# Patient Record
Sex: FEMALE | Race: WHITE | NOT HISPANIC OR LATINO | Employment: PART TIME | URBAN - METROPOLITAN AREA
[De-identification: names, ages, dates, MRNs, and addresses within clinical notes are randomized per-mention and may not be internally consistent; named-entity substitution may affect disease eponyms.]

---

## 2020-09-28 ENCOUNTER — OFFICE VISIT (OUTPATIENT)
Dept: OBSTETRICS AND GYNECOLOGY | Facility: CLINIC | Age: 40
End: 2020-09-28

## 2020-09-28 VITALS
SYSTOLIC BLOOD PRESSURE: 112 MMHG | BODY MASS INDEX: 23.25 KG/M2 | HEIGHT: 68 IN | WEIGHT: 153.4 LBS | DIASTOLIC BLOOD PRESSURE: 60 MMHG

## 2020-09-28 DIAGNOSIS — D68.51 FACTOR V LEIDEN (HCC): ICD-10-CM

## 2020-09-28 DIAGNOSIS — Z30.41 ENCOUNTER FOR SURVEILLANCE OF CONTRACEPTIVE PILLS: ICD-10-CM

## 2020-09-28 DIAGNOSIS — Z01.419 ENCOUNTER FOR ANNUAL ROUTINE GYNECOLOGICAL EXAMINATION: Primary | ICD-10-CM

## 2020-09-28 DIAGNOSIS — Z80.3 FAMILY HISTORY OF BREAST CANCER: ICD-10-CM

## 2020-09-28 PROCEDURE — 99395 PREV VISIT EST AGE 18-39: CPT | Performed by: NURSE PRACTITIONER

## 2020-09-28 RX ORDER — ACETAMINOPHEN AND CODEINE PHOSPHATE 120; 12 MG/5ML; MG/5ML
1 SOLUTION ORAL DAILY
Qty: 28 TABLET | Refills: 12 | Status: SHIPPED | OUTPATIENT
Start: 2020-09-28 | End: 2021-08-21

## 2020-09-28 RX ORDER — ASPIRIN 81 MG/1
81 TABLET ORAL DAILY
COMMUNITY

## 2020-09-28 RX ORDER — ACETAMINOPHEN AND CODEINE PHOSPHATE 120; 12 MG/5ML; MG/5ML
1 SOLUTION ORAL DAILY
COMMUNITY
End: 2020-09-28 | Stop reason: SDUPTHER

## 2020-09-28 NOTE — PROGRESS NOTES
GYN Annual Exam     CC - Here for annual exam.        HPI  Korina Rodriguez is a 39 y.o. female, , who presents for annual well woman exam. Patient's last menstrual period was 09/10/2020 (approximate)..  Periods are regular every 25-35 days, lasting 3-4 days. .  Dysmenorrhea:none.  Patient reports problems with: none.  Partner Status: Marital Status: .  New Partners since last visit: no.  Desires STD Screening: no.    Additional OB/GYN History   Current contraception: contraceptive methods: ocp- northindrone. H/o Factor V Leiden.   Desires to: continue contraception  Last Pap : 2018- normal  Last Completed Pap Smear       Status Date      PAP SMEAR No completions recorded        History of abnormal Pap smear: no  Family history of uterine, colon, breast, or ovarian cancer: yes - Mother- Breast, dx age 60.   Performs monthly Self-Breast Exam: no  Exercises Regularly:no  Feelings of Anxiety or Depression: no  Tobacco Usage?: No   OB History        2    Para   2    Term                AB        Living   2       SAB        TAB        Ectopic        Molar        Multiple        Live Births                    Health Maintenance   Topic Date Due   • Annual Gynecologic Pelvic and Breast Exam  1982   • ANNUAL PHYSICAL  10/18/1985   • TDAP/TD VACCINES (1 - Tdap) 10/18/2001   • INFLUENZA VACCINE  2020   • HEPATITIS C SCREENING  2020   • PAP SMEAR  2020   • Pneumococcal Vaccine 0-64  Aged Out       The additional following portions of the patient's history were reviewed and updated as appropriate: allergies, current medications, past family history, past medical history, past social history, past surgical history and problem list.    Review of Systems   Constitutional: Negative.    HENT: Negative.    Eyes: Negative.    Respiratory: Negative.    Cardiovascular: Negative.    Gastrointestinal: Negative.    Endocrine: Negative.    Genitourinary: Negative.    Musculoskeletal:  "Negative.    Skin: Negative.    Allergic/Immunologic: Negative.    Neurological: Negative.    Hematological: Negative.    Psychiatric/Behavioral: Negative.      All other systems reviewed and are negative.     I have reviewed and agree with the HPI, ROS, and historical information as entered above. Ange Ramirez, MY    Objective   /60   Ht 172.7 cm (68\")   Wt 69.6 kg (153 lb 6.4 oz)   LMP 09/10/2020 (Approximate)   BMI 23.32 kg/m²     Physical Exam  Vitals signs and nursing note reviewed. Exam conducted with a chaperone present.   Constitutional:       Appearance: She is well-developed.   HENT:      Head: Normocephalic and atraumatic.   Neck:      Musculoskeletal: Normal range of motion. No muscular tenderness.      Thyroid: No thyroid mass or thyromegaly.   Cardiovascular:      Rate and Rhythm: Normal rate and regular rhythm.      Heart sounds: No murmur.   Pulmonary:      Effort: Pulmonary effort is normal. No retractions.      Breath sounds: Normal breath sounds. No wheezing, rhonchi or rales.   Chest:      Chest wall: No mass or tenderness.      Breasts:         Right: Normal. No mass, nipple discharge, skin change or tenderness.         Left: Normal. No mass, nipple discharge, skin change or tenderness.   Abdominal:      General: Bowel sounds are normal.      Palpations: Abdomen is soft. Abdomen is not rigid. There is no mass.      Tenderness: There is no abdominal tenderness. There is no guarding.      Hernia: No hernia is present. There is no hernia in the left inguinal area.   Genitourinary:     Labia:         Right: No rash, tenderness or lesion.         Left: No rash, tenderness or lesion.       Vagina: Normal. No vaginal discharge or lesions.      Cervix: Normal.      Uterus: Normal. Not enlarged, not fixed and not tender.       Adnexa:         Right: No mass or tenderness.          Left: No mass or tenderness.        Rectum: No external hemorrhoid.   Neurological:      Mental Status: She is " alert and oriented to person, place, and time.   Psychiatric:         Behavior: Behavior normal.            Assessment and Plan    Problem List Items Addressed This Visit     None      Visit Diagnoses     Encounter for annual routine gynecological examination    -  Primary    Relevant Orders    Pap IG, HPV-hr    Encounter for surveillance of contraceptive pills        Factor V Leiden (CMS/[facility])        Family history of breast cancer              1. GYN annual well woman exam.   2. Reviewed monthly self breast exams.  Instructed to call with lumps, pain, or breast discharge.    3. Reviewed exercise as a preventative health measures.   4. Reccommended Flu Vaccine in Fall of each year.  5. RTC in 1 year or PRN with problems   6. Reviewed pap guidelines, next pap due 2023.       Ange Ramirez, APRN  09/28/2020

## 2020-10-02 DIAGNOSIS — Z01.419 ENCOUNTER FOR ANNUAL ROUTINE GYNECOLOGICAL EXAMINATION: ICD-10-CM

## 2021-08-21 RX ORDER — ACETAMINOPHEN AND CODEINE PHOSPHATE 120; 12 MG/5ML; MG/5ML
1 SOLUTION ORAL DAILY
Qty: 84 TABLET | Refills: 0 | Status: SHIPPED | OUTPATIENT
Start: 2021-08-21 | End: 2021-10-20 | Stop reason: SDUPTHER

## 2021-10-20 ENCOUNTER — OFFICE VISIT (OUTPATIENT)
Dept: OBSTETRICS AND GYNECOLOGY | Facility: CLINIC | Age: 41
End: 2021-10-20

## 2021-10-20 VITALS
DIASTOLIC BLOOD PRESSURE: 72 MMHG | SYSTOLIC BLOOD PRESSURE: 102 MMHG | WEIGHT: 150.8 LBS | HEIGHT: 68 IN | BODY MASS INDEX: 22.85 KG/M2

## 2021-10-20 DIAGNOSIS — Z01.419 PAP TEST, AS PART OF ROUTINE GYNECOLOGICAL EXAMINATION: Primary | ICD-10-CM

## 2021-10-20 DIAGNOSIS — Z30.41 ENCOUNTER FOR SURVEILLANCE OF CONTRACEPTIVE PILLS: ICD-10-CM

## 2021-10-20 PROCEDURE — 99395 PREV VISIT EST AGE 18-39: CPT | Performed by: NURSE PRACTITIONER

## 2021-10-20 RX ORDER — ACETAMINOPHEN AND CODEINE PHOSPHATE 120; 12 MG/5ML; MG/5ML
1 SOLUTION ORAL DAILY
Qty: 84 TABLET | Refills: 3 | Status: SHIPPED | OUTPATIENT
Start: 2021-10-20 | End: 2022-10-24 | Stop reason: SDUPTHER

## 2021-10-20 NOTE — PROGRESS NOTES
GYN Annual Exam     CC - Here for annual exam.        HPI  Korina Rodriguez is a 41 y.o. female, , who presents for annual well woman exam. Patient's last menstrual period was 10/13/2021..  Periods are irregular, lasting 4 days.  Dysmenorrhea:none.  Patient reports problems with: abnormal bleeding.  There were no changes to her medical or surgical history since her last visit.. Partner Status: Marital Status: .  She is sexually active. She has not had new partners since her last STD testing. She does not desire STD testing.      Patient stated that she has had 3 episodes of spotting. Patient stated that the spotting was random and was not consistent. Patient denies any pain or discomfort. Patient denies any other issues or concerns at today's visit. Pt admits to missing POP's, and sometimes not taking them at the same time.     Additional OB/GYN History   Current contraception: contraceptive methods: OCP (estrogen/progesterone)  Desires to: continue contraception  Last Pap :   Last Completed Pap Smear          Ordered - PAP SMEAR (Every 3 Years) Ordered on 10/20/2021    2020  SCANNED - PAP SMEAR              History of abnormal Pap smear: no  Family history of uterine, colon, breast, or ovarian cancer: yes - Mother has breast cancer  Performs monthly Self-Breast Exam: no  Exercises Regularly:no  Feelings of Anxiety or Depression: no  Tobacco Usage?: No   OB History        2    Para   2    Term                AB        Living   2       SAB        IAB        Ectopic        Molar        Multiple        Live Births                    Health Maintenance   Topic Date Due   • ANNUAL PHYSICAL  Never done   • TDAP/TD VACCINES (1 - Tdap) Never done   • HEPATITIS C SCREENING  Never done   • INFLUENZA VACCINE  Never done   • Annual Gynecologic Pelvic and Breast Exam  10/21/2022   • PAP SMEAR  2023   • COVID-19 Vaccine  Completed   • Pneumococcal Vaccine 0-64  Aged Out       The additional  "following portions of the patient's history were reviewed and updated as appropriate: allergies, current medications, past family history, past medical history, past social history and past surgical history.    Review of Systems   Constitutional: Negative.    HENT: Negative.    Eyes: Negative.    Respiratory: Negative.    Cardiovascular: Negative.    Gastrointestinal: Negative.    Endocrine: Negative.    Genitourinary:        Occ spotting on POP's   Musculoskeletal: Negative.    Skin: Negative.    Allergic/Immunologic: Negative.    Neurological: Negative.    Hematological: Negative.    Psychiatric/Behavioral: Negative.          I have reviewed and agree with the HPI, ROS, and historical information as entered above. Ange Ramirez, APRN    Objective   /72   Ht 172.7 cm (68\")   Wt 68.4 kg (150 lb 12.8 oz)   LMP 10/13/2021   Breastfeeding No   BMI 22.93 kg/m²     Physical Exam  Vitals and nursing note reviewed. Exam conducted with a chaperone present.   Constitutional:       Appearance: She is well-developed.   HENT:      Head: Normocephalic and atraumatic.   Neck:      Thyroid: No thyroid mass or thyromegaly.   Cardiovascular:      Rate and Rhythm: Normal rate and regular rhythm.      Heart sounds: No murmur heard.      Pulmonary:      Effort: Pulmonary effort is normal. No retractions.      Breath sounds: Normal breath sounds. No wheezing, rhonchi or rales.   Chest:      Chest wall: No mass or tenderness.   Breasts:      Right: Normal. No mass, nipple discharge, skin change or tenderness.      Left: Normal. No mass, nipple discharge, skin change or tenderness.       Abdominal:      General: Bowel sounds are normal.      Palpations: Abdomen is soft. Abdomen is not rigid. There is no mass.      Tenderness: There is no abdominal tenderness. There is no guarding.      Hernia: No hernia is present. There is no hernia in the left inguinal area.   Genitourinary:     Labia:         Right: No rash, tenderness or " lesion.         Left: No rash, tenderness or lesion.       Vagina: Normal. No vaginal discharge or lesions.      Cervix: No cervical motion tenderness, discharge, lesion or cervical bleeding.      Uterus: Normal. Not enlarged, not fixed and not tender.       Adnexa:         Right: No mass or tenderness.          Left: No mass or tenderness.        Rectum: No external hemorrhoid.   Musculoskeletal:      Cervical back: Normal range of motion. No muscular tenderness.   Neurological:      Mental Status: She is alert and oriented to person, place, and time.   Psychiatric:         Behavior: Behavior normal.            Assessment and Plan    Problem List Items Addressed This Visit     None      Visit Diagnoses     Pap test, as part of routine gynecological examination    -  Primary    Relevant Orders    Pap IG, HPV-hr    Encounter for surveillance of contraceptive pills              1. GYN annual well woman exam.   2. Reviewed monthly self breast exams.  Instructed to call with lumps, pain, or breast discharge.    3. Reviewed exercise as a preventative health measures.   4. Other: Spotting on POP's- Pt admits to sometimes missing or taking pills late. Will take same time daily. Advised to call if continues despite taking correctly and we can consider labs and U/S. Pt not a candidate for estrogen containing birth control.   Return in about 1 year (around 10/20/2022) for Annual physical.      MY Vigil  10/20/2021

## 2021-10-27 DIAGNOSIS — Z01.419 PAP TEST, AS PART OF ROUTINE GYNECOLOGICAL EXAMINATION: ICD-10-CM

## 2022-10-24 ENCOUNTER — OFFICE VISIT (OUTPATIENT)
Dept: OBSTETRICS AND GYNECOLOGY | Facility: CLINIC | Age: 42
End: 2022-10-24

## 2022-10-24 VITALS — SYSTOLIC BLOOD PRESSURE: 118 MMHG | DIASTOLIC BLOOD PRESSURE: 78 MMHG | WEIGHT: 144.2 LBS | BODY MASS INDEX: 21.93 KG/M2

## 2022-10-24 DIAGNOSIS — Z30.41 ENCOUNTER FOR SURVEILLANCE OF CONTRACEPTIVE PILLS: ICD-10-CM

## 2022-10-24 DIAGNOSIS — Z01.419 ROUTINE GYNECOLOGICAL EXAMINATION: Primary | ICD-10-CM

## 2022-10-24 DIAGNOSIS — Z12.31 ENCOUNTER FOR SCREENING MAMMOGRAM FOR MALIGNANT NEOPLASM OF BREAST: ICD-10-CM

## 2022-10-24 PROCEDURE — 99396 PREV VISIT EST AGE 40-64: CPT | Performed by: NURSE PRACTITIONER

## 2022-10-24 RX ORDER — ACETAMINOPHEN AND CODEINE PHOSPHATE 120; 12 MG/5ML; MG/5ML
1 SOLUTION ORAL DAILY
Qty: 84 TABLET | Refills: 3 | Status: SHIPPED | OUTPATIENT
Start: 2022-10-24

## 2022-10-24 NOTE — PROGRESS NOTES
Gynecologic Annual Exam Note          GYN Annual Exam     Gynecologic Exam        Subjective     HPI  Korina Rodriguez is a 42 y.o. female, , who presents for annual well woman exam as a established patient . Patient's last menstrual period was 10/07/2022 (exact date)..  Periods are irregular, lasting 2-7 days. Patient reports problems with: none.  Partner Status: Marital Status: . She is is sexually active. She has not had new partners.. STD testing recommendations have been explained to the patient and she does not desire STD testing. There were no changes to her medical or surgical history since her last visit..       Additional OB/GYN History   Current contraception: contraceptive methods: OCP (estrogen/progesterone)  Desires to: continue contraception    Last Pap : 10/20/2021. Result: negative. HPV: negative  Last Completed Pap Smear          Ordered - PAP SMEAR (Every 3 Years) Ordered on 10/27/2021    10/20/2021  SCANNED - PAP SMEAR    2020  SCANNED - PAP SMEAR              History of abnormal Pap smear: no  Family history of uterine, colon, breast, or ovarian cancer: yes - Mother- Breast  Performs monthly Self-Breast Exam: yes  Last mammogram: Never.     Last Completed Mammogram     This patient has no relevant Health Maintenance data.          History of abnormal mammogram: no    Colonoscopy: has never had a colonoscopy.  Exercises Regularly: no  Feelings of Anxiety or Depression: no  Tobacco Usage?: No       Current Outpatient Medications:   •  aspirin 81 MG EC tablet, Take 81 mg by mouth Daily., Disp: , Rfl:   •  norethindrone (Nolvia) 0.35 MG tablet, Take 1 tablet by mouth Daily., Disp: 84 tablet, Rfl: 3     Patient is requesting refills of OCP.    OB History        2    Para   2    Term                AB        Living   2       SAB        IAB        Ectopic        Molar        Multiple        Live Births                    Past Medical History:   Diagnosis Date   • Factor  MAHI Yarbrough ([facility])    • Screening breast examination     self admits        No past surgical history on file.    Health Maintenance   Topic Date Due   • ANNUAL PHYSICAL  Never done   • TDAP/TD VACCINES (1 - Tdap) Never done   • HEPATITIS C SCREENING  Never done   • COVID-19 Vaccine (4 - Booster for Pfizer series) 02/09/2022   • INFLUENZA VACCINE  08/01/2022   • Annual Gynecologic Pelvic and Breast Exam  10/21/2022   • PAP SMEAR  10/20/2024   • Pneumococcal Vaccine 0-64  Aged Out       The additional following portions of the patient's history were reviewed and updated as appropriate: allergies, current medications, past family history, past medical history, past social history, past surgical history and problem list.    Review of Systems   Constitutional: Negative.    HENT: Negative.    Eyes: Negative.    Respiratory: Negative.    Cardiovascular: Negative.    Gastrointestinal: Negative.    Endocrine: Negative.    Genitourinary: Negative.    Musculoskeletal: Negative.    Skin: Negative.    Allergic/Immunologic: Negative.    Neurological: Negative.    Hematological: Negative.    Psychiatric/Behavioral: Negative.          I have reviewed and agree with the HPI, ROS, and historical information as entered above. Smiley Ackerman, APRN      Objective   /78   Wt 65.4 kg (144 lb 3.2 oz)   LMP 10/07/2022 (Exact Date)   BMI 21.93 kg/m²     Physical Exam  Vitals and nursing note reviewed. Exam conducted with a chaperone present.   Constitutional:       Appearance: Normal appearance. She is well-developed and normal weight.   HENT:      Head: Normocephalic and atraumatic.   Neck:      Thyroid: No thyroid mass or thyromegaly.   Cardiovascular:      Rate and Rhythm: Normal rate and regular rhythm.      Heart sounds: No murmur heard.  Pulmonary:      Effort: Pulmonary effort is normal. No retractions.      Breath sounds: Normal breath sounds. No wheezing, rhonchi or rales.   Chest:      Chest wall: No mass or  tenderness.   Breasts:     Right: Normal. No mass, nipple discharge, skin change or tenderness.      Left: Normal. No mass, nipple discharge, skin change or tenderness.   Abdominal:      Palpations: Abdomen is soft. Abdomen is not rigid. There is no mass.      Tenderness: There is no abdominal tenderness. There is no guarding.      Hernia: No hernia is present.   Genitourinary:     General: Normal vulva.      Exam position: Lithotomy position.      Labia:         Right: No rash, tenderness or lesion.         Left: No rash, tenderness or lesion.       Vagina: Normal. No vaginal discharge or lesions.      Cervix: No cervical motion tenderness, discharge, lesion or cervical bleeding.      Uterus: Normal. Not enlarged, not fixed and not tender.       Adnexa: Right adnexa normal and left adnexa normal.        Right: No mass or tenderness.          Left: No mass or tenderness.        Rectum: Normal. No external hemorrhoid.   Musculoskeletal:      Cervical back: Normal range of motion. No muscular tenderness.   Neurological:      Mental Status: She is alert and oriented to person, place, and time.   Psychiatric:         Behavior: Behavior normal.            Assessment and Plan    Problem List Items Addressed This Visit    None  Visit Diagnoses     Routine gynecological examination    -  Primary    Encounter for screening mammogram for malignant neoplasm of breast        Relevant Orders    Mammo Screening Digital Tomosynthesis Bilateral With CAD    Encounter for surveillance of contraceptive pills        Relevant Medications    norethindrone (Nolvia) 0.35 MG tablet          1. GYN annual well woman exam.   2. Pt doing well on current ocp and desires to continue. Erx refilled  3. Pap guidelines reviewed.  4. Reviewed risks/benefits of hormonal contraception after age 35, including possible increased risk of breast cancer, heart disease, blood clots and strokes.  Patient strongly desires to stay on hormonal  contraception.  5. Reviewed monthly self breast exams.  Instructed to call with lumps, pain, or breast discharge.    6. Ordered Mammogram today  7. Reviewed exercise as a preventative health measures.   8. Reccommended Flu Vaccine in Fall of each year.  9. RTC in 1 year or PRN with problems.    Smiley Ackerman, APRN  10/24/2022

## 2022-12-07 ENCOUNTER — APPOINTMENT (OUTPATIENT)
Dept: MAMMOGRAPHY | Facility: HOSPITAL | Age: 42
End: 2022-12-07

## 2023-05-09 ENCOUNTER — OFFICE VISIT (OUTPATIENT)
Dept: ENDOCRINOLOGY | Facility: CLINIC | Age: 43
End: 2023-05-09
Payer: COMMERCIAL

## 2023-05-09 VITALS
HEART RATE: 81 BPM | BODY MASS INDEX: 20.61 KG/M2 | HEIGHT: 68 IN | WEIGHT: 136 LBS | DIASTOLIC BLOOD PRESSURE: 82 MMHG | OXYGEN SATURATION: 99 % | SYSTOLIC BLOOD PRESSURE: 110 MMHG

## 2023-05-09 DIAGNOSIS — E05.90 HYPERTHYROIDISM: Primary | Chronic | ICD-10-CM

## 2023-05-09 PROCEDURE — 99204 OFFICE O/P NEW MOD 45 MIN: CPT | Performed by: PHYSICIAN ASSISTANT

## 2023-05-09 PROCEDURE — 84445 ASSAY OF TSI GLOBULIN: CPT | Performed by: PHYSICIAN ASSISTANT

## 2023-05-09 PROCEDURE — 86376 MICROSOMAL ANTIBODY EACH: CPT | Performed by: PHYSICIAN ASSISTANT

## 2023-05-09 PROCEDURE — 84439 ASSAY OF FREE THYROXINE: CPT | Performed by: PHYSICIAN ASSISTANT

## 2023-05-09 PROCEDURE — 86800 THYROGLOBULIN ANTIBODY: CPT | Performed by: PHYSICIAN ASSISTANT

## 2023-05-09 PROCEDURE — 84443 ASSAY THYROID STIM HORMONE: CPT | Performed by: PHYSICIAN ASSISTANT

## 2023-05-09 PROCEDURE — 84480 ASSAY TRIIODOTHYRONINE (T3): CPT | Performed by: PHYSICIAN ASSISTANT

## 2023-05-09 PROCEDURE — 83520 IMMUNOASSAY QUANT NOS NONAB: CPT | Performed by: PHYSICIAN ASSISTANT

## 2023-05-09 NOTE — PROGRESS NOTES
"Chief Complaint:   Korina Rodriguez is a 42 y.o. female who is being seen today for hyperthyroidism. Referred by Deja Antonio MD.    HPI     42 y.o. female presents for further evaluation and management of hyperthyroidism.     She had a syncopal episode while sitting in [employer] 4/2023. She was sitting down and suddenly felt like everything \"froze\" and then she just passed out. She was unconscious less than a minute. She does not recall any palpitations or diaphoresis prior to syncope. When EMS arrived her BG was 71. She reports increase in sugar intake recently. The day before syncope she had quite a bit of sweets and regular soda. The morning of syncope she had toast and milk. No hx of BG issues. No family hx of diabetes. Her mom also had syncopal episodes, but she is not sure if she ever got a dx.     Patient has had presyncopal spells several times in the last 10 years but this was the first time she actually passed out.   The presyncopal episodes tend to happen a few days before her period. Her periods are irregular but no changes recently.   She has lost close to 20 pounds in the last two years with improved diet. She does report about a 6 pounds weight loss over the last few months that surprised her as she was not actively trying to lose weight.  Appetite is stable.  Small increase in hair loss.  She had a slight tremor of left hand the night before she passed out. Otherwise no tremors.  A few days prior to syncope she had a brief episode of palpitations otherwise nothing.    Few days before syncope palpitations 1 time, none the day of syncope.   Eyesight slightly more blurry. No double vision or eye pressure. No compressive symptoms.     Hx of radiation to head/neck: no  Family hx of thyroid cancer: no    The following portions of the patient's history were reviewed and updated as appropriate: allergies, current medications, past family history, past medical history, past social history, past surgical history " and problem list.    Review of Systems  Review of Systems   Constitutional: Positive for unexpected weight change (weight loss).   Neurological: Positive for syncope.   All other systems reviewed and are negative.       Current medications:  Current Outpatient Medications   Medication Sig Dispense Refill   • aspirin 81 MG EC tablet Take 1 tablet by mouth Daily.     • norethindrone (Nolvia) 0.35 MG tablet Take 1 tablet by mouth Daily. 84 tablet 3     No current facility-administered medications for this visit.       Physical Exam   Vitals:    05/09/23 1345   BP: 110/82   Pulse: 81   SpO2: 99%   Body mass index is 20.68 kg/m².  Physical Exam  Constitutional:       General: She is not in acute distress.     Appearance: She is well-developed. She is not diaphoretic.   HENT:      Head: Normocephalic.      Right Ear: External ear normal.      Left Ear: External ear normal.   Eyes:      General: Lids are normal.         Right eye: No discharge.         Left eye: No discharge.      Conjunctiva/sclera: Conjunctivae normal.   Neck:      Thyroid: Thyromegaly present. No thyroid mass.      Vascular: No JVD.      Trachea: No tracheal deviation.   Cardiovascular:      Rate and Rhythm: Normal rate and regular rhythm.      Heart sounds: Normal heart sounds. No murmur heard.  Pulmonary:      Effort: Pulmonary effort is normal. No respiratory distress.      Breath sounds: Normal breath sounds. No wheezing.   Musculoskeletal:         General: No tenderness.   Lymphadenopathy:      Cervical: No cervical adenopathy.   Skin:     General: Skin is warm and dry.      Findings: No erythema or rash.   Neurological:      Mental Status: She is alert and oriented to person, place, and time.   Psychiatric:         Speech: Speech normal.         Behavior: Behavior normal.         Thought Content: Thought content normal.         Labs and Imaging   No results found for: TSH, [id number], W8YTERC, THYROIDAB         External labs reviewed:  5/5/2023: TSH  less than 0.03, free T42.34, range 0.75-1.54), CBC within normal limits, GFR greater than 60, AST 14, ALT 15  4/18/2023: TSH less than 0.03    Assessment / Plan     Diagnoses and all orders for this visit:    1. Hyperthyroidism (Primary)  -     TSH  -     T4, Free  -     T3  -     Thyroid Stimulating Immunoglobulin  -     Thyrotropin Receptor Antibody  -     Thyroid Antibodies        Problem List Items Addressed This Visit    None  Visit Diagnoses     Hyperthyroidism    -  Primary    Relevant Orders    TSH    T4, Free    T3    Thyroid Stimulating Immunoglobulin    Thyrotropin Receptor Antibody    Thyroid Antibodies        Diagnosis was discussed and reviewed with the patient including the advantages of drug therapy.        1. Patient appears clinically hyperthyroid. Discussed possible etiologies of hyperthyroidism including thyroiditis, Graves' disease, and toxic nodule. Repeat TFTs. Check thyroid antibodies and thyroid ultrasound. Discussed treatment options - will wait results of labs. Hold off on beta blocker as she rarely had palpitations/tremors, HR wnl and BP low normal during visit today.   2. Patient will return to our office in 4 weeks.  3. The risks and benefits of my recommendations, as well as other treatment options were discussed with the patient today. Questions were answered.      Ludwig Cruz PA-C  Endocrinology

## 2023-05-09 NOTE — LETTER
"May 9, 2023       No Recipients    Patient: Korina Rodriguez   YOB: 1980   Date of Visit: 5/9/2023       Dear Dr. Walker Recipients:    Thank you for referring Korina Rodriguez to me for evaluation. Below are the relevant portions of my assessment and plan of care.    If you have questions, please do not hesitate to call me. I look forward to following Korina along with you.         Sincerely,        Ludwig Cruz PA-C        CC:   No Recipients    Ludwig Cruz PA-C  05/09/23 1455  Sign when Signing Visit  Chief Complaint:  Korina Rodriguez is a 42 y.o. female who is being seen today for hyperthyroidism. Referred by Deja Antonio MD.    HPI     42 y.o. female presents for further evaluation and management of hyperthyroidism.     She had a syncopal episode while sitting in [employer] 4/2023. She was sitting down and suddenly felt like everything \"froze\" and then she just passed out. She was unconscious less than a minute. She does not recall any palpitations or diaphoresis prior to syncope. When EMS arrived her BG was 71. She reports increase in sugar intake recently. The day before syncope she had quite a bit of sweets and regular soda. The morning of syncope she had toast and milk. No hx of BG issues. No family hx of diabetes. Her mom also had syncopal episodes, but she is not sure if she ever got a dx.     Patient has had presyncopal spells several times in the last 10 years but this was the first time she actually passed out.   The presyncopal episodes tend to happen a few days before her period. Her periods are irregular but no changes recently.   She has lost close to 20 pounds in the last two years with improved diet. She does report about a 6 pounds weight loss over the last few months that surprised her as she was not actively trying to lose weight.  Appetite is stable.  Small increase in hair loss.  She had a slight tremor of left hand the night before she passed out. Otherwise no tremors.  A " few days prior to syncope she had a brief episode of palpitations otherwise nothing.    Few days before syncope palpitations 1 time, none the day of syncope.   Eyesight slightly more blurry. No double vision or eye pressure. No compressive symptoms.     Hx of radiation to head/neck: no  Family hx of thyroid cancer: no    The following portions of the patient's history were reviewed and updated as appropriate: allergies, current medications, past family history, past medical history, past social history, past surgical history and problem list.    Review of Systems  Review of Systems   Constitutional: Positive for unexpected weight change (weight loss).   Neurological: Positive for syncope.   All other systems reviewed and are negative.       Current medications:  Current Outpatient Medications   Medication Sig Dispense Refill   • aspirin 81 MG EC tablet Take 1 tablet by mouth Daily.     • norethindrone (Nolvia) 0.35 MG tablet Take 1 tablet by mouth Daily. 84 tablet 3     No current facility-administered medications for this visit.       Physical Exam   Vitals:    05/09/23 1345   BP: 110/82   Pulse: 81   SpO2: 99%   Body mass index is 20.68 kg/m².  Physical Exam  Constitutional:       General: She is not in acute distress.     Appearance: She is well-developed. She is not diaphoretic.   HENT:      Head: Normocephalic.      Right Ear: External ear normal.      Left Ear: External ear normal.   Eyes:      General: Lids are normal.         Right eye: No discharge.         Left eye: No discharge.      Conjunctiva/sclera: Conjunctivae normal.   Neck:      Thyroid: Thyromegaly present. No thyroid mass.      Vascular: No JVD.      Trachea: No tracheal deviation.   Cardiovascular:      Rate and Rhythm: Normal rate and regular rhythm.      Heart sounds: Normal heart sounds. No murmur heard.  Pulmonary:      Effort: Pulmonary effort is normal. No respiratory distress.      Breath sounds: Normal breath sounds. No wheezing.    Musculoskeletal:         General: No tenderness.   Lymphadenopathy:      Cervical: No cervical adenopathy.   Skin:     General: Skin is warm and dry.      Findings: No erythema or rash.   Neurological:      Mental Status: She is alert and oriented to person, place, and time.   Psychiatric:         Speech: Speech normal.         Behavior: Behavior normal.         Thought Content: Thought content normal.         Labs and Imaging   No results found for: TSH, C9GOYBG, G0JXPVW, THYROIDAB         External labs reviewed:  5/5/2023: TSH less than 0.03, free T42.34, range 0.75-1.54), CBC within normal limits, GFR greater than 60, AST 14, ALT 15  4/18/2023: TSH less than 0.03    Assessment / Plan     Diagnoses and all orders for this visit:    1. Hyperthyroidism (Primary)  -     TSH  -     T4, Free  -     T3  -     Thyroid Stimulating Immunoglobulin  -     Thyrotropin Receptor Antibody  -     Thyroid Antibodies        Problem List Items Addressed This Visit    None  Visit Diagnoses     Hyperthyroidism    -  Primary    Relevant Orders    TSH    T4, Free    T3    Thyroid Stimulating Immunoglobulin    Thyrotropin Receptor Antibody    Thyroid Antibodies        Diagnosis was discussed and reviewed with the patient including the advantages of drug therapy.      1. Patient appears clinically hyperthyroid. Discussed possible etiologies of hyperthyroidism including thyroiditis, Graves' disease, and toxic nodule. Repeat TFTs. Check thyroid antibodies and thyroid ultrasound. Discussed treatment options - will wait results of labs. Hold off on beta blocker as she rarely had palpitations/tremors, HR wnl and BP low normal during visit today.   2. Patient will return to our office in 4 weeks.  3. The risks and benefits of my recommendations, as well as other treatment options were discussed with the patient today. Questions were answered.      Ludwig Cruz PA-C  Endocrinology

## 2023-05-10 LAB
T3 SERPL-MCNC: 222 NG/DL (ref 80–200)
T4 FREE SERPL-MCNC: 2.97 NG/DL (ref 0.93–1.7)
TSH SERPL DL<=0.05 MIU/L-ACNC: <0.005 UIU/ML (ref 0.27–4.2)

## 2023-05-11 LAB
THYROGLOB AB SERPL-ACNC: <1 IU/ML (ref 0–0.9)
THYROPEROXIDASE AB SERPL-ACNC: 53 IU/ML (ref 0–34)

## 2023-05-12 LAB — TSI SER-ACNC: 3.96 IU/L (ref 0–0.55)

## 2023-05-12 RX ORDER — METHIMAZOLE 5 MG/1
5 TABLET ORAL DAILY
Qty: 30 TABLET | Refills: 1 | Status: SHIPPED | OUTPATIENT
Start: 2023-05-12 | End: 2024-05-11

## 2023-05-13 LAB — TSH RECEP AB SER-ACNC: 4.85 IU/L (ref 0–1.75)

## 2023-06-05 ENCOUNTER — OFFICE VISIT (OUTPATIENT)
Dept: ENDOCRINOLOGY | Facility: CLINIC | Age: 43
End: 2023-06-05
Payer: COMMERCIAL

## 2023-06-05 VITALS
WEIGHT: 136 LBS | SYSTOLIC BLOOD PRESSURE: 114 MMHG | HEART RATE: 91 BPM | OXYGEN SATURATION: 99 % | DIASTOLIC BLOOD PRESSURE: 62 MMHG | BODY MASS INDEX: 20.61 KG/M2 | HEIGHT: 68 IN

## 2023-06-05 DIAGNOSIS — E05.90 HYPERTHYROIDISM: Primary | Chronic | ICD-10-CM

## 2023-06-05 LAB
ALBUMIN SERPL-MCNC: 4.6 G/DL (ref 3.5–5.2)
ALBUMIN/GLOB SERPL: 1.8 G/DL
ALP SERPL-CCNC: 49 U/L (ref 39–117)
ALT SERPL W P-5'-P-CCNC: 24 U/L (ref 1–33)
ANION GAP SERPL CALCULATED.3IONS-SCNC: 9.1 MMOL/L (ref 5–15)
AST SERPL-CCNC: 15 U/L (ref 1–32)
BASOPHILS # BLD AUTO: 0.02 10*3/MM3 (ref 0–0.2)
BASOPHILS NFR BLD AUTO: 0.3 % (ref 0–1.5)
BILIRUB SERPL-MCNC: 0.4 MG/DL (ref 0–1.2)
BUN SERPL-MCNC: 10 MG/DL (ref 6–20)
BUN/CREAT SERPL: 14.7 (ref 7–25)
CALCIUM SPEC-SCNC: 10.5 MG/DL (ref 8.6–10.5)
CHLORIDE SERPL-SCNC: 104 MMOL/L (ref 98–107)
CO2 SERPL-SCNC: 26.9 MMOL/L (ref 22–29)
CREAT SERPL-MCNC: 0.68 MG/DL (ref 0.57–1)
DEPRECATED RDW RBC AUTO: 38.2 FL (ref 37–54)
EGFRCR SERPLBLD CKD-EPI 2021: 113.1 ML/MIN/1.73
EOSINOPHIL # BLD AUTO: 0.2 10*3/MM3 (ref 0–0.4)
EOSINOPHIL NFR BLD AUTO: 3.2 % (ref 0.3–6.2)
ERYTHROCYTE [DISTWIDTH] IN BLOOD BY AUTOMATED COUNT: 12.8 % (ref 12.3–15.4)
GLOBULIN UR ELPH-MCNC: 2.5 GM/DL
GLUCOSE SERPL-MCNC: 78 MG/DL (ref 65–99)
HCT VFR BLD AUTO: 40.2 % (ref 34–46.6)
HGB BLD-MCNC: 13.5 G/DL (ref 12–15.9)
IMM GRANULOCYTES # BLD AUTO: 0.01 10*3/MM3 (ref 0–0.05)
IMM GRANULOCYTES NFR BLD AUTO: 0.2 % (ref 0–0.5)
LYMPHOCYTES # BLD AUTO: 2.48 10*3/MM3 (ref 0.7–3.1)
LYMPHOCYTES NFR BLD AUTO: 39.4 % (ref 19.6–45.3)
MCH RBC QN AUTO: 28 PG (ref 26.6–33)
MCHC RBC AUTO-ENTMCNC: 33.6 G/DL (ref 31.5–35.7)
MCV RBC AUTO: 83.2 FL (ref 79–97)
MONOCYTES # BLD AUTO: 0.64 10*3/MM3 (ref 0.1–0.9)
MONOCYTES NFR BLD AUTO: 10.2 % (ref 5–12)
NEUTROPHILS NFR BLD AUTO: 2.95 10*3/MM3 (ref 1.7–7)
NEUTROPHILS NFR BLD AUTO: 46.7 % (ref 42.7–76)
NRBC BLD AUTO-RTO: 0 /100 WBC (ref 0–0.2)
PLATELET # BLD AUTO: 236 10*3/MM3 (ref 140–450)
PMV BLD AUTO: 10 FL (ref 6–12)
POTASSIUM SERPL-SCNC: 4.3 MMOL/L (ref 3.5–5.2)
PROT SERPL-MCNC: 7.1 G/DL (ref 6–8.5)
RBC # BLD AUTO: 4.83 10*6/MM3 (ref 3.77–5.28)
SODIUM SERPL-SCNC: 140 MMOL/L (ref 136–145)
T3 SERPL-MCNC: 151 NG/DL (ref 80–200)
T4 FREE SERPL-MCNC: 1.83 NG/DL (ref 0.93–1.7)
TSH SERPL DL<=0.05 MIU/L-ACNC: <0.005 UIU/ML (ref 0.27–4.2)
WBC NRBC COR # BLD: 6.3 10*3/MM3 (ref 3.4–10.8)

## 2023-06-05 PROCEDURE — 84439 ASSAY OF FREE THYROXINE: CPT | Performed by: PHYSICIAN ASSISTANT

## 2023-06-05 PROCEDURE — 99213 OFFICE O/P EST LOW 20 MIN: CPT | Performed by: PHYSICIAN ASSISTANT

## 2023-06-05 PROCEDURE — 80050 GENERAL HEALTH PANEL: CPT | Performed by: PHYSICIAN ASSISTANT

## 2023-06-05 PROCEDURE — 84480 ASSAY TRIIODOTHYRONINE (T3): CPT | Performed by: PHYSICIAN ASSISTANT

## 2023-06-05 RX ORDER — PROPRANOLOL HYDROCHLORIDE 10 MG/1
10 TABLET ORAL 2 TIMES DAILY
Qty: 60 TABLET | Refills: 3 | Status: SHIPPED | OUTPATIENT
Start: 2023-06-05

## 2023-06-05 NOTE — PROGRESS NOTES
Chief Complaint:   Korina Rodriguez is a 42 y.o. female who is being seen today for f/u of hyperthyroidism.     HPI     42 y.o. female presents for f/u of hyperthyroidism.     She was started on methimazole 5 mg daily last visit due to undetectable TSH and high FT4 2.97 and high total T3 222. TSI elevated at 3.96 and TSH receptor antibody elevated at 4.85. TPO was mildly elevated at 53.     Every now and then eyes get blurry but this is overall better than last time.   No further syncope.   Heart thumping hard, not fast, when moving around sometime.   No tremors or palpitations.   Appetite and weight are stable.     The following portions of the patient's history were reviewed and updated as appropriate: allergies, current medications, past family history, past medical history, past social history, past surgical history and problem list.    Review of Systems  Review of Systems   All other systems reviewed and are negative.     Current medications:  Current Outpatient Medications   Medication Sig Dispense Refill    aspirin 81 MG EC tablet Take 1 tablet by mouth Daily.      methIMAzole (TAPAZOLE) 5 MG tablet Take 1 tablet by mouth Daily. 30 tablet 1    norethindrone (Nolvia) 0.35 MG tablet Take 1 tablet by mouth Daily. 84 tablet 3    propranolol (INDERAL) 10 MG tablet Take 1 tablet by mouth 2 (Two) Times a Day. Take 1/2-1 pill BID As needed for tremors and palpitations 60 tablet 3     No current facility-administered medications for this visit.       Physical Exam   Vitals:    06/05/23 0827   BP: 114/62   Pulse: 91   SpO2: 99%   Body mass index is 20.68 kg/m².  Physical Exam  Constitutional:       General: She is not in acute distress.     Appearance: She is well-developed. She is not diaphoretic.   HENT:      Head: Normocephalic.      Right Ear: External ear normal.      Left Ear: External ear normal.   Eyes:      General: Lids are normal.         Right eye: No discharge.         Left eye: No discharge.       Conjunctiva/sclera: Conjunctivae normal.   Neck:      Thyroid: Thyromegaly present. No thyroid mass.      Vascular: No JVD.      Trachea: No tracheal deviation.   Cardiovascular:      Rate and Rhythm: Normal rate and regular rhythm.      Heart sounds: Normal heart sounds. No murmur heard.  Pulmonary:      Effort: Pulmonary effort is normal. No respiratory distress.      Breath sounds: Normal breath sounds. No wheezing.   Musculoskeletal:         General: No tenderness.   Lymphadenopathy:      Cervical: No cervical adenopathy.   Skin:     General: Skin is warm and dry.      Findings: No erythema or rash.   Neurological:      Mental Status: She is alert and oriented to person, place, and time.   Psychiatric:         Speech: Speech normal.         Behavior: Behavior normal.         Thought Content: Thought content normal.       Labs and Imaging   Lab Results   Component Value Date    TSH <0.005 (L) 05/09/2023    K1TEMZL 222.0 (H) 05/09/2023    THYROIDAB 53 (H) 05/09/2023         Assessment / Plan     Diagnoses and all orders for this visit:    1. Hyperthyroidism (Primary)  -     US Thyroid; Future  -     TSH  -     T4, Free  -     T3  -     Comprehensive Metabolic Panel  -     CBC & Differential  -     propranolol (INDERAL) 10 MG tablet; Take 1 tablet by mouth 2 (Two) Times a Day. Take 1/2-1 pill BID As needed for tremors and palpitations  Dispense: 60 tablet; Refill: 3  -     TSH; Future  -     T4, Free; Future  -     T3; Future  -     Comprehensive Metabolic Panel; Future  -     CBC & Differential; Future        Problem List Items Addressed This Visit    None  Visit Diagnoses       Hyperthyroidism  (Chronic)   -  Primary    Relevant Medications    propranolol (INDERAL) 10 MG tablet    Other Relevant Orders    US Thyroid    TSH    T4, Free    T3    Comprehensive Metabolic Panel    CBC & Differential    TSH    T4, Free    T3    Comprehensive Metabolic Panel    CBC & Differential        Diagnosis was discussed and  reviewed with the patient including the advantages of drug therapy.        1. Patient appears clinically euthyroid. Etiology likely Graves' disease with elevated antibodies. Check thyroid u/s. Repeat TFTs. Propranolol 10 mg 1/2-1 tab BID PRN sent in case she develops palpitations/tremors. Continue methimazole 5 mg daily until labs are reviewed.   2. Patient will return to our office in 3 months with repeat labs in 6 weeks.   3. The risks and benefits of my recommendations, as well as other treatment options were discussed with the patient today. Questions were answered.      Ludwig Cruz PA-C  Endocrinology

## 2023-06-09 RX ORDER — METHIMAZOLE 5 MG/1
TABLET ORAL
Qty: 30 TABLET | Refills: 1 | Status: SHIPPED | OUTPATIENT
Start: 2023-06-09

## 2023-06-09 NOTE — TELEPHONE ENCOUNTER
Rx Refill Note  Requested Prescriptions     Pending Prescriptions Disp Refills   • methIMAzole (TAPAZOLE) 5 MG tablet [Pharmacy Med Name: METHIMAZOLE 5 MG TABS 5 Tablet] 30 tablet 1     Sig: TAKE 1 TABLET BY MOUTH EVERY DAY      Last office visit with prescribing clinician: 6/5/2023      Next office visit with prescribing clinician: 10/17/2023                  Jyothi Chamberlain MA  06/09/23, 13:48 EDT

## 2023-07-20 ENCOUNTER — LAB (OUTPATIENT)
Dept: ENDOCRINOLOGY | Facility: CLINIC | Age: 43
End: 2023-07-20
Payer: COMMERCIAL

## 2023-07-20 DIAGNOSIS — E05.90 HYPERTHYROIDISM: ICD-10-CM

## 2023-07-20 LAB
ALBUMIN SERPL-MCNC: 4.7 G/DL (ref 3.5–5.2)
ALBUMIN/GLOB SERPL: 2.2 G/DL
ALP SERPL-CCNC: 57 U/L (ref 39–117)
ALT SERPL W P-5'-P-CCNC: 26 U/L (ref 1–33)
ANION GAP SERPL CALCULATED.3IONS-SCNC: 9 MMOL/L (ref 5–15)
AST SERPL-CCNC: 21 U/L (ref 1–32)
BASOPHILS # BLD AUTO: 0.05 10*3/MM3 (ref 0–0.2)
BASOPHILS NFR BLD AUTO: 0.6 % (ref 0–1.5)
BILIRUB SERPL-MCNC: 0.2 MG/DL (ref 0–1.2)
BUN SERPL-MCNC: 12 MG/DL (ref 6–20)
BUN/CREAT SERPL: 13.5 (ref 7–25)
CALCIUM SPEC-SCNC: 9.3 MG/DL (ref 8.6–10.5)
CHLORIDE SERPL-SCNC: 104 MMOL/L (ref 98–107)
CO2 SERPL-SCNC: 27 MMOL/L (ref 22–29)
CREAT SERPL-MCNC: 0.89 MG/DL (ref 0.57–1)
DEPRECATED RDW RBC AUTO: 39.4 FL (ref 37–54)
EGFRCR SERPLBLD CKD-EPI 2021: 84.2 ML/MIN/1.73
EOSINOPHIL # BLD AUTO: 0.22 10*3/MM3 (ref 0–0.4)
EOSINOPHIL NFR BLD AUTO: 2.9 % (ref 0.3–6.2)
ERYTHROCYTE [DISTWIDTH] IN BLOOD BY AUTOMATED COUNT: 13.3 % (ref 12.3–15.4)
GLOBULIN UR ELPH-MCNC: 2.1 GM/DL
GLUCOSE SERPL-MCNC: 89 MG/DL (ref 65–99)
HCT VFR BLD AUTO: 36.8 % (ref 34–46.6)
HGB BLD-MCNC: 12.5 G/DL (ref 12–15.9)
IMM GRANULOCYTES # BLD AUTO: 0.03 10*3/MM3 (ref 0–0.05)
IMM GRANULOCYTES NFR BLD AUTO: 0.4 % (ref 0–0.5)
LYMPHOCYTES # BLD AUTO: 2.7 10*3/MM3 (ref 0.7–3.1)
LYMPHOCYTES NFR BLD AUTO: 35.1 % (ref 19.6–45.3)
MCH RBC QN AUTO: 28.2 PG (ref 26.6–33)
MCHC RBC AUTO-ENTMCNC: 34 G/DL (ref 31.5–35.7)
MCV RBC AUTO: 82.9 FL (ref 79–97)
MONOCYTES # BLD AUTO: 0.64 10*3/MM3 (ref 0.1–0.9)
MONOCYTES NFR BLD AUTO: 8.3 % (ref 5–12)
NEUTROPHILS NFR BLD AUTO: 4.06 10*3/MM3 (ref 1.7–7)
NEUTROPHILS NFR BLD AUTO: 52.7 % (ref 42.7–76)
NRBC BLD AUTO-RTO: 0 /100 WBC (ref 0–0.2)
PLATELET # BLD AUTO: 224 10*3/MM3 (ref 140–450)
PMV BLD AUTO: 9.9 FL (ref 6–12)
POTASSIUM SERPL-SCNC: 3.9 MMOL/L (ref 3.5–5.2)
PROT SERPL-MCNC: 6.8 G/DL (ref 6–8.5)
RBC # BLD AUTO: 4.44 10*6/MM3 (ref 3.77–5.28)
SODIUM SERPL-SCNC: 140 MMOL/L (ref 136–145)
T3 SERPL-MCNC: 92.1 NG/DL (ref 80–200)
T4 FREE SERPL-MCNC: 1.02 NG/DL (ref 0.93–1.7)
TSH SERPL DL<=0.05 MIU/L-ACNC: 0.33 UIU/ML (ref 0.27–4.2)
WBC NRBC COR # BLD: 7.7 10*3/MM3 (ref 3.4–10.8)

## 2023-07-20 PROCEDURE — 36415 COLL VENOUS BLD VENIPUNCTURE: CPT | Performed by: PHYSICIAN ASSISTANT

## 2023-07-20 PROCEDURE — 84480 ASSAY TRIIODOTHYRONINE (T3): CPT | Performed by: PHYSICIAN ASSISTANT

## 2023-07-20 PROCEDURE — 84439 ASSAY OF FREE THYROXINE: CPT | Performed by: PHYSICIAN ASSISTANT

## 2023-07-20 PROCEDURE — 80050 GENERAL HEALTH PANEL: CPT | Performed by: PHYSICIAN ASSISTANT

## 2023-08-11 RX ORDER — METHIMAZOLE 5 MG/1
TABLET ORAL
Qty: 30 TABLET | Refills: 2 | Status: SHIPPED | OUTPATIENT
Start: 2023-08-11

## 2023-08-11 NOTE — TELEPHONE ENCOUNTER
Rx Refill Note  Requested Prescriptions     Pending Prescriptions Disp Refills    methIMAzole (TAPAZOLE) 5 MG tablet [Pharmacy Med Name: METHIMAZOLE 5 MG TABS 5 Tablet] 30 tablet 1     Sig: TAKE 1 TABLET BY MOUTH EVERY DAY      Last office visit with prescribing clinician: 6/5/2023   Last telemedicine visit with prescribing clinician: Visit date not found   Next office visit with prescribing clinician: 10/17/2023                         Would you like a call back once the refill request has been completed: [] Yes [] No    If the office needs to give you a call back, can they leave a voicemail: [] Yes [] No    Stephanie Laughlin CMA  08/11/23, 16:18 EDT

## 2023-09-08 ENCOUNTER — OFFICE VISIT (OUTPATIENT)
Dept: ENDOCRINOLOGY | Facility: CLINIC | Age: 43
End: 2023-09-08
Payer: COMMERCIAL

## 2023-09-08 VITALS
HEIGHT: 68 IN | DIASTOLIC BLOOD PRESSURE: 62 MMHG | HEART RATE: 70 BPM | BODY MASS INDEX: 21.07 KG/M2 | OXYGEN SATURATION: 100 % | SYSTOLIC BLOOD PRESSURE: 120 MMHG | WEIGHT: 139 LBS

## 2023-09-08 DIAGNOSIS — E05.90 HYPERTHYROIDISM: ICD-10-CM

## 2023-09-08 DIAGNOSIS — E04.1 LEFT THYROID NODULE: Primary | ICD-10-CM

## 2023-09-08 LAB
T4 FREE SERPL-MCNC: 1.19 NG/DL (ref 0.93–1.7)
TSH SERPL DL<=0.05 MIU/L-ACNC: 3.48 UIU/ML (ref 0.27–4.2)

## 2023-09-08 PROCEDURE — 84443 ASSAY THYROID STIM HORMONE: CPT | Performed by: INTERNAL MEDICINE

## 2023-09-08 PROCEDURE — 84439 ASSAY OF FREE THYROXINE: CPT | Performed by: INTERNAL MEDICINE

## 2023-09-08 NOTE — PROGRESS NOTES
"Hyperthyroidism (Follow up )    Subjective   Korina Rodriguez is a 42 y.o. female. she is being seen for follow-up. She has been seen by Ludwig for hyperthyroidism management.   Hypethyroidism is treated with the methimazole. It was diagnosed in 5/2023 when TSH was < 0.05. She is currently taking 5 mg methimazole daily.     Thyroid nodule dx in 5/2023. Ultrasound from 6/2023 reviewed - there is 1.8 x 2 cm left thyroid nodule with TIRADS 5. Nodule is taller than wide. FNA is recommended.     Since starting methimazole her cardiac symptoms improved. She reported fatigue. No neck pain or swelling.     Medications:    Current Outpatient Medications:     aspirin 81 MG EC tablet, Take 1 tablet by mouth Daily., Disp: , Rfl:     methIMAzole (TAPAZOLE) 5 MG tablet, TAKE 1 TABLET BY MOUTH EVERY DAY, Disp: 30 tablet, Rfl: 2    norethindrone (Nolvia) 0.35 MG tablet, Take 1 tablet by mouth Daily., Disp: 84 tablet, Rfl: 0    propranolol (INDERAL) 10 MG tablet, Take 1 tablet by mouth 2 (Two) Times a Day. Take 1/2-1 pill BID As needed for tremors and palpitations (Patient not taking: Reported on 9/8/2023), Disp: 60 tablet, Rfl: 3      Review of Systems   Constitutional:  Positive for fatigue.     Objective   Vitals:    09/08/23 0835   BP: 120/62   Pulse: 70   SpO2: 100%   Weight: 63 kg (139 lb)   Height: 172.7 cm (68\")    Body mass index is 21.13 kg/m².   Physical Exam  Vitals reviewed.   Constitutional:       Appearance: Normal appearance.   Neck:      Thyroid: Thyromegaly (left 2 cm nodule.) present.   Cardiovascular:      Rate and Rhythm: Normal rate and regular rhythm.      Pulses: Normal pulses.      Heart sounds: Normal heart sounds.   Pulmonary:      Effort: Pulmonary effort is normal.      Breath sounds: Normal breath sounds.   Musculoskeletal:         General: No swelling.   Neurological:      Mental Status: She is alert and oriented to person, place, and time.   Psychiatric:         Mood and Affect: Mood normal.         " Thought Content: Thought content normal.         Results for orders placed or performed in visit on 09/08/23   T4, Free    Specimen: Arm, Left; Blood   Result Value Ref Range    Free T4 1.19 0.93 - 1.70 ng/dL   TSH    Specimen: Arm, Left; Blood   Result Value Ref Range    TSH 3.480 0.270 - 4.200 uIU/mL             Thyroid ultrasound 09/08/23            Real time high resolution imaging of the thyroid gland was performed in transverse and longitudinal planes.   Previous images were reviewed and compared to the current appearance to assess stability.       The right lobe measured 4.65 cm L x 1.68 cm AP x 1.62  cm in TV dimension.    The isthmus measured 0.36 cm in thickness.    The left thyroid lobe measured 4.95 cm L x 2.78 cm AP x 1.67 cm in TV dimension.    Thyroid gland is homogeneous and contains single nodules.    Nodule 1   is located in the left lower lobe and measures 1.91 x 2.1 x 1.3 cm (L X AP X TV).  This nodule is solid, heterogeneous, isoechoic with irregular margins, and Grade II vascularity on Color Flow Doppler.  The nodule is taller than wide and has irregular borders      No pathologic lymph nodes were seen.    Thyroid Biopsy Procedure Note      Indications: Thyroid Nodule    Anesthesia: ethyl chloride spray    Procedure Details   The procedure, risks and complications have been discussed in detail (including, but not limited to airway compromise, infection, bleeding) with the patient, and the patient has signed consent to the procedure    The neck was prepped in sterile fashion..   Biopsy site: left.  With ultrasound guidance of needle localization,   4  aspirates were obtained with sterile 27 g needles.  A single container with 20 ml of cytology fixative was used to collect needle and syringe washings.   Specimens sent to pathology.   Additional specimen is collected for molecular analysis and will be sent in the event of undetermined biopsy.     The patient tolerated the procedure without  difficulty or complications.        Assessment: Left thyroid nodule is biopsied with u/s guidance.   Repeat ultrasound in 12 months.       Assessment/Plan:    Problem List Items Addressed This Visit          Other    Hyperthyroidism    Overview     Hyperthyroidism          Other Visit Diagnoses       Left thyroid nodule    -  Primary    Relevant Orders    US Thyroid (Completed)    T4, Free (Completed)    TSH (Completed)    NON-GYN CYTOLOGY, [facility] ([facility] only)            Old records were reviewed and summarized in HPI section of the note.  Previous ultrasound report was reviewed and compared to current finding.   Left thyroid nodule is biopsied.     Thyroid function repeated and normal. Cont same dose of methimazole for now, will start titrating down in the next few months.     Return in about 10 months (around 7/8/2024) for ultrasound.

## 2023-09-11 LAB — REF LAB TEST METHOD: NORMAL

## 2023-09-12 PROBLEM — E04.1 SOLITARY THYROID NODULE: Status: ACTIVE | Noted: 2023-09-12

## 2023-10-17 ENCOUNTER — OFFICE VISIT (OUTPATIENT)
Dept: ENDOCRINOLOGY | Facility: CLINIC | Age: 43
End: 2023-10-17
Payer: COMMERCIAL

## 2023-10-17 VITALS
BODY MASS INDEX: 21.52 KG/M2 | HEIGHT: 68 IN | HEART RATE: 91 BPM | SYSTOLIC BLOOD PRESSURE: 112 MMHG | DIASTOLIC BLOOD PRESSURE: 68 MMHG | OXYGEN SATURATION: 99 % | WEIGHT: 142 LBS

## 2023-10-17 DIAGNOSIS — E04.1 LEFT THYROID NODULE: Chronic | ICD-10-CM

## 2023-10-17 DIAGNOSIS — E05.90 HYPERTHYROIDISM: Primary | Chronic | ICD-10-CM

## 2023-10-17 DIAGNOSIS — E05.90 HYPERTHYROIDISM: Primary | ICD-10-CM

## 2023-10-17 LAB
T4 FREE SERPL-MCNC: 1.13 NG/DL (ref 0.93–1.7)
TSH SERPL DL<=0.05 MIU/L-ACNC: 3.93 UIU/ML (ref 0.27–4.2)

## 2023-10-17 PROCEDURE — 84439 ASSAY OF FREE THYROXINE: CPT | Performed by: PHYSICIAN ASSISTANT

## 2023-10-17 PROCEDURE — 84443 ASSAY THYROID STIM HORMONE: CPT | Performed by: PHYSICIAN ASSISTANT

## 2023-10-17 RX ORDER — METHIMAZOLE 5 MG/1
5 TABLET ORAL DAILY
Qty: 16 TABLET | Refills: 2 | Status: SHIPPED | OUTPATIENT
Start: 2023-10-17 | End: 2023-10-18 | Stop reason: SDUPTHER

## 2023-10-17 NOTE — PROGRESS NOTES
Chief Complaint:   Korina Rodriguez is a 42 y.o. female who is being seen today for f/u of hyperthyroidism.     HPI     42 y.o. female presents for f/u of hyperthyroidism.     She was started on methimazole 5 mg daily 6/2023 due to undetectable TSH and high FT4 2.97 and high total T3 222. TSI elevated at 3.96 and TSH receptor antibody elevated at 4.85. TPO was mildly elevated at 53. Repeat TFTs July and Sept were normal on current dose. Cardiac symptoms have resolved on methimazole. She is not requiring propranolol.  No eye symptoms or compressive symptoms.  Overall feels good without new/worsening symptoms.     Left thyroid nodule - saw Dr. Jacques 9/2023 for bx - pathology was non diagnostic, she has a f/u with Dr Jacques 4/2024 for repeat u/s to check stability     The following portions of the patient's history were reviewed and updated as appropriate: allergies, current medications, past family history, past medical history, past social history, past surgical history and problem list.    Review of Systems  Review of Systems   All other systems reviewed and are negative.       Current medications:  Current Outpatient Medications   Medication Sig Dispense Refill    aspirin 81 MG EC tablet Take 1 tablet by mouth Daily.      methIMAzole (TAPAZOLE) 5 MG tablet TAKE 1 TABLET BY MOUTH EVERY DAY 30 tablet 2    norethindrone (Nolvia) 0.35 MG tablet Take 1 tablet by mouth Daily. 84 tablet 0     No current facility-administered medications for this visit.       Physical Exam   Vitals:    10/17/23 0906   BP: 112/68   Pulse: 91   SpO2: 99%   Body mass index is 21.59 kg/m².  Physical Exam  Constitutional:       General: She is not in acute distress.     Appearance: She is well-developed. She is not diaphoretic.   Eyes:      General: Lids are normal.      Comments: No exophthalmos bilaterally   Neck:      Thyroid: Thyromegaly present. No thyroid mass.      Vascular: No JVD.      Trachea: No tracheal deviation.   Cardiovascular:       Rate and Rhythm: Normal rate and regular rhythm.      Heart sounds: Normal heart sounds. No murmur heard.  Pulmonary:      Effort: Pulmonary effort is normal. No respiratory distress.      Breath sounds: Normal breath sounds. No wheezing.   Musculoskeletal:         General: No tenderness.   Lymphadenopathy:      Cervical: No cervical adenopathy.   Skin:     General: Skin is warm and dry.      Findings: No erythema or rash.   Neurological:      Mental Status: She is alert and oriented to person, place, and time.      Comments: No hand tremor bilaterally   Psychiatric:         Speech: Speech normal.         Behavior: Behavior normal.         Thought Content: Thought content normal.         Labs and Imaging   Lab Results   Component Value Date    TSH 3.480 09/08/2023    K9RKBET 92.1 07/20/2023    THYROIDAB 53 (H) 05/09/2023         Assessment / Plan     Diagnoses and all orders for this visit:    1. Hyperthyroidism (Primary)  -     TSH  -     T4, Free    2. Left thyroid nodule          Problem List Items Addressed This Visit       Hyperthyroidism - Primary    Overview     Hyperthyroidism         Relevant Orders    TSH    T4, Free     Other Visit Diagnoses       Left thyroid nodule  (Chronic)             Diagnosis was discussed and reviewed with the patient including the advantages of drug therapy.        Patient appears clinically euthyroid. Etiology likely Graves' disease with elevated antibodies.  Continue methimazole 5 mg daily.  Repeat TFTs.  Dr. Jacques is following thyroid nodule.    Patient will return to our office in 3 months.    Ludwig Cruz PA-C  Endocrinology

## 2023-10-18 DIAGNOSIS — E05.90 HYPERTHYROIDISM: ICD-10-CM

## 2023-10-18 RX ORDER — METHIMAZOLE 5 MG/1
TABLET ORAL
Qty: 16 TABLET | Refills: 2 | Status: SHIPPED | OUTPATIENT
Start: 2023-10-18

## 2023-10-18 NOTE — TELEPHONE ENCOUNTER
Fax came through from [facility] stating they need clarification on the patients Methimazole rx. Sig states to take 1 tab daily and then says take 1/2 tablets daily. Per labs from yesterday Ludwig wants pt to take 1/2 tab daily then have labs repeated in 6 weeks. Rx sig updated and pended to be signed.

## 2023-11-17 ENCOUNTER — OFFICE VISIT (OUTPATIENT)
Dept: OBSTETRICS AND GYNECOLOGY | Facility: CLINIC | Age: 43
End: 2023-11-17
Payer: COMMERCIAL

## 2023-11-17 VITALS
BODY MASS INDEX: 22.29 KG/M2 | WEIGHT: 142 LBS | HEIGHT: 67 IN | DIASTOLIC BLOOD PRESSURE: 68 MMHG | SYSTOLIC BLOOD PRESSURE: 110 MMHG

## 2023-11-17 DIAGNOSIS — Z30.41 ENCOUNTER FOR SURVEILLANCE OF CONTRACEPTIVE PILLS: ICD-10-CM

## 2023-11-17 DIAGNOSIS — Z01.419 WOMEN'S ANNUAL ROUTINE GYNECOLOGICAL EXAMINATION: Primary | ICD-10-CM

## 2023-11-17 DIAGNOSIS — Z12.31 ENCOUNTER FOR SCREENING MAMMOGRAM FOR MALIGNANT NEOPLASM OF BREAST: ICD-10-CM

## 2023-11-17 RX ORDER — ACETAMINOPHEN AND CODEINE PHOSPHATE 120; 12 MG/5ML; MG/5ML
1 SOLUTION ORAL DAILY
Qty: 84 TABLET | Refills: 4 | Status: SHIPPED | OUTPATIENT
Start: 2023-11-17

## 2023-11-17 NOTE — PROGRESS NOTES
Gynecologic Annual Exam Note          GYN Annual Exam     Gynecologic Exam      Subjective     HPI  Korina Rodriguez is a 43 y.o. female, , who presents for annual well woman exam as a established patient . Patient's last menstrual period was 10/27/2023 (exact date).  Patient reports problems with: none.  Her periods occur irregularly , lasting 3 days due to hyperthyroidism.  The flow is moderate.She reports dysmenorrhea is none. Partner Status: Marital Status: . She is is sexually active. She has not had new partners.. STD testing recommendations have been explained to the patient and she does not desire STD testing. Since her last visit the patient was diagnosed with hyperthyroidism . She has been on Tapazole since May.       Additional OB/GYN History   Current contraception: contraceptive methods: Oral progesterone-only contraceptive  Desires to: continue contraception    Last Pap : 10/20/21. Result: negative. HPV: negative.   Last Completed Pap Smear            PAP SMEAR (Every 3 Years) Next due on 10/20/2024      10/20/2021  SCANNED - PAP SMEAR    2020  SCANNED - PAP SMEAR                  History of abnormal Pap smear: no  Family history of uterine, colon, breast, or ovarian cancer: yes - mother - breast CA  Performs monthly Self-Breast Exam: no  Last mammogram: never done    Last Completed Mammogram       This patient has no relevant Health Maintenance data.            History of abnormal mammogram: no    Colonoscopy: has never had a colonoscopy.  Exercises Regularly: no  Feelings of Anxiety or Depression: no  Tobacco Usage?: No       Current Outpatient Medications:     aspirin 81 MG EC tablet, Take 1 tablet by mouth Daily., Disp: , Rfl:     methIMAzole (TAPAZOLE) 5 MG tablet, Take 1/2 pill daily, dose decrease based on labs 10/17/2023, Disp: 16 tablet, Rfl: 2    norethindrone (Nolvia) 0.35 MG tablet, Take 1 tablet by mouth Daily., Disp: 84 tablet, Rfl: 4     Patient is requesting refills  "of year supply of birth control to [facility].    OB History          2    Para   2    Term                AB        Living   2         SAB        IAB        Ectopic        Molar        Multiple        Live Births                    Past Medical History:   Diagnosis Date    Factor V Leiden     Hyperthyroidism 23    Hyperthyroidism    Hyperthyroidism 2023    Hyperthyroidism    Screening breast examination     self admits    Solitary thyroid nodule 2023        History reviewed. No pertinent surgical history.    Health Maintenance   Topic Date Due    MAMMOGRAM  Never done    HEPATITIS C SCREENING  Never done    ANNUAL PHYSICAL  Never done    TDAP/TD VACCINES (2 - Tdap) 2023    INFLUENZA VACCINE  2023    COVID-19 Vaccine (3 - - season) 2023    Annual Gynecologic Pelvic and Breast Exam  10/25/2023    PAP SMEAR  10/20/2024    Pneumococcal Vaccine 0-64  Aged Out       The additional following portions of the patient's history were reviewed and updated as appropriate: allergies, current medications, past family history, past medical history, past social history, past surgical history, and problem list.    Review of Systems   Respiratory: Negative.  Negative for shortness of breath.    Cardiovascular: Negative.  Negative for chest pain.   Gastrointestinal: Negative.  Negative for abdominal distention, abdominal pain and constipation.   Genitourinary:  Negative for breast discharge, breast lump, breast pain, dyspareunia, dysuria, menstrual problem, pelvic pain, pelvic pressure, urinary incontinence, vaginal bleeding, vaginal discharge and vaginal pain.   Psychiatric/Behavioral: Negative.  Negative for depressed mood. The patient is not nervous/anxious.    All other systems reviewed and are negative.        I have reviewed and agree with the HPI, ROS, and historical information as entered above. Acacia Harden, APRN        Objective   /68   Ht 170.2 cm (67\")   " Wt 64.4 kg (142 lb)   LMP 10/27/2023 (Exact Date)   BMI 22.24 kg/m²     Physical Exam  Vitals and nursing note reviewed. Exam conducted with a chaperone present.   Constitutional:       Appearance: Normal appearance. She is well-developed. She is not ill-appearing.   HENT:      Head: Normocephalic and atraumatic.   Neck:      Thyroid: No thyroid mass, thyromegaly or thyroid tenderness.   Cardiovascular:      Rate and Rhythm: Normal rate and regular rhythm.      Heart sounds: Normal heart sounds. No murmur heard.  Pulmonary:      Effort: Pulmonary effort is normal. No retractions.      Breath sounds: Normal breath sounds. No wheezing, rhonchi or rales.   Chest:      Chest wall: No mass or tenderness.   Breasts:     Breasts are symmetrical.      Right: Normal. No mass, nipple discharge, skin change or tenderness.      Left: Normal. No mass, nipple discharge, skin change or tenderness.   Abdominal:      Palpations: Abdomen is soft. Abdomen is not rigid. There is no mass.      Tenderness: There is no abdominal tenderness. There is no guarding or rebound.      Hernia: No hernia is present. There is no hernia in the left inguinal area or right inguinal area.   Genitourinary:     General: Normal vulva.      Labia:         Right: No rash, tenderness or lesion.         Left: No rash, tenderness or lesion.       Vagina: Normal. No signs of injury. Vaginal discharge and bleeding present. No erythema, tenderness or lesions.      Cervix: Discharge and cervical bleeding present. No cervical motion tenderness, friability, lesion or erythema.      Uterus: Normal. Not enlarged, not fixed and not tender.       Adnexa: Right adnexa normal and left adnexa normal.        Right: No mass or tenderness.          Left: No mass or tenderness.        Rectum: No external hemorrhoid.      Comments: Small amt tan d/c noted.   Musculoskeletal:      Cervical back: Normal range of motion. No muscular tenderness.   Lymphadenopathy:      Upper  Body:      Right upper body: No supraclavicular or axillary adenopathy.      Left upper body: No supraclavicular or axillary adenopathy.   Neurological:      Mental Status: She is alert and oriented to person, place, and time.   Psychiatric:         Mood and Affect: Mood normal.         Behavior: Behavior normal.          Assessment and Plan    Problem List Items Addressed This Visit    None  Visit Diagnoses       Women's annual routine gynecological examination    -  Primary    Encounter for surveillance of contraceptive pills        Relevant Medications    norethindrone (Nolvia) 0.35 MG tablet    Encounter for screening mammogram for malignant neoplasm of breast        Relevant Orders    Mammo Screening Digital Tomosynthesis Bilateral With CAD          GYN annual well woman exam.   Pap guidelines reviewed. No pap.  Discharge noted. Declined STI, BV, yeast testing.  She is happy on Nolvia. She understands risks of blood clots, stroke, and theoretical risk of breast cancer.  Denies family history of blood clots. Rf sent.  Reviewed monthly self breast exams.  Instructed to call with lumps, pain, or breast discharge.    Ordered Mammogram today. Advised to begin annual mammograms.   Reviewed exercise as a preventative health measures.   Reccommended Flu Vaccine in Fall of each year.  Return in about 1 year (around 11/17/2024) for Annual physical or sooner if needed.     Acacia Harden, APRN  11/17/2023

## 2023-12-05 DIAGNOSIS — Z30.41 ENCOUNTER FOR SURVEILLANCE OF CONTRACEPTIVE PILLS: ICD-10-CM

## 2023-12-06 RX ORDER — NORETHINDRONE 0.35 MG/1
1 TABLET ORAL DAILY
Qty: 84 TABLET | Refills: 3 | OUTPATIENT
Start: 2023-12-06

## 2023-12-07 ENCOUNTER — LAB (OUTPATIENT)
Dept: ENDOCRINOLOGY | Facility: CLINIC | Age: 43
End: 2023-12-07
Payer: COMMERCIAL

## 2023-12-07 DIAGNOSIS — E05.90 HYPERTHYROIDISM: ICD-10-CM

## 2023-12-07 PROCEDURE — 36415 COLL VENOUS BLD VENIPUNCTURE: CPT | Performed by: INTERNAL MEDICINE

## 2023-12-07 PROCEDURE — 84443 ASSAY THYROID STIM HORMONE: CPT | Performed by: PHYSICIAN ASSISTANT

## 2023-12-07 PROCEDURE — 84439 ASSAY OF FREE THYROXINE: CPT | Performed by: PHYSICIAN ASSISTANT

## 2023-12-08 DIAGNOSIS — E05.90 HYPERTHYROIDISM: Primary | ICD-10-CM

## 2023-12-08 LAB
T4 FREE SERPL-MCNC: 1.23 NG/DL (ref 0.93–1.7)
TSH SERPL DL<=0.05 MIU/L-ACNC: 2.67 UIU/ML (ref 0.27–4.2)

## 2024-01-16 ENCOUNTER — HOSPITAL ENCOUNTER (OUTPATIENT)
Dept: MAMMOGRAPHY | Facility: HOSPITAL | Age: 44
Discharge: HOME | End: 2024-01-16
Payer: COMMERCIAL

## 2024-01-16 DIAGNOSIS — Z12.31 ENCOUNTER FOR SCREENING MAMMOGRAM FOR MALIGNANT NEOPLASM OF BREAST: ICD-10-CM

## 2024-01-16 PROCEDURE — 77063 BREAST TOMOSYNTHESIS BI: CPT

## 2024-01-16 PROCEDURE — 77067 SCR MAMMO BI INCL CAD: CPT

## 2024-01-25 PROBLEM — R92.8 ABNORMAL MAMMOGRAM: Status: ACTIVE | Noted: 2024-01-25

## 2024-01-30 ENCOUNTER — OFFICE VISIT (OUTPATIENT)
Dept: ENDOCRINOLOGY | Facility: CLINIC | Age: 44
End: 2024-01-30
Payer: COMMERCIAL

## 2024-01-30 VITALS
SYSTOLIC BLOOD PRESSURE: 120 MMHG | WEIGHT: 148 LBS | OXYGEN SATURATION: 99 % | DIASTOLIC BLOOD PRESSURE: 70 MMHG | HEIGHT: 68 IN | HEART RATE: 73 BPM | BODY MASS INDEX: 22.43 KG/M2

## 2024-01-30 DIAGNOSIS — E05.90 HYPERTHYROIDISM: Primary | Chronic | ICD-10-CM

## 2024-01-30 LAB
T4 FREE SERPL-MCNC: 1.27 NG/DL (ref 0.93–1.7)
TSH SERPL DL<=0.05 MIU/L-ACNC: 2.21 UIU/ML (ref 0.27–4.2)

## 2024-01-30 PROCEDURE — 84443 ASSAY THYROID STIM HORMONE: CPT | Performed by: PHYSICIAN ASSISTANT

## 2024-01-30 PROCEDURE — 83520 IMMUNOASSAY QUANT NOS NONAB: CPT | Performed by: PHYSICIAN ASSISTANT

## 2024-01-30 PROCEDURE — 84445 ASSAY OF TSI GLOBULIN: CPT | Performed by: PHYSICIAN ASSISTANT

## 2024-01-30 PROCEDURE — 84439 ASSAY OF FREE THYROXINE: CPT | Performed by: PHYSICIAN ASSISTANT

## 2024-01-30 NOTE — PROGRESS NOTES
Chief Complaint:   Korina Rodriguez is a 43 y.o. female who is being seen today for f/u of hyperthyroidism.     HPI     43 y.o. female presents for f/u of hyperthyroidism.     She was started on methimazole 5 mg daily 6/2023 due to undetectable TSH and high FT4 2.97 and high total T3 222. TSI elevated at 3.96 and TSH receptor antibody elevated at 4.85. TPO was mildly elevated at 53. Cardiac symptoms have resolved on methimazole. She is not requiring propranolol.    Came off methimazole 12/7/2023. Was on 2.5 mg daily at that time.     She feels well overall.  She has occasional palpitations with her period, otherwise no issues.   No hyperthyroid symptoms.  Sleep is good.   No eye symptoms or compressive symptoms.    Left thyroid nodule - saw Dr. Jacques 9/2023 for bx - pathology was non diagnostic, she has a f/u with Dr Jacques 4/2024 for repeat u/s to check stability       The following portions of the patient's history were reviewed and updated as appropriate: allergies, current medications, past family history, past medical history, past social history, past surgical history and problem list.    Review of Systems  Review of Systems   All other systems reviewed and are negative.       Current medications:  Current Outpatient Medications   Medication Sig Dispense Refill    aspirin 81 MG EC tablet Take 1 tablet by mouth Daily.       No current facility-administered medications for this visit.       Physical Exam   Vitals:    01/30/24 0826   BP: 120/70   Pulse: 73   SpO2: 99%   Body mass index is 22.5 kg/m².  Physical Exam  Constitutional:       General: She is not in acute distress.     Appearance: She is well-developed. She is not diaphoretic.   Eyes:      General: Lids are normal.      Comments: No exophthalmos bilaterally   Neck:      Thyroid: Thyromegaly present. No thyroid mass.      Vascular: No JVD.      Trachea: No tracheal deviation.   Cardiovascular:      Rate and Rhythm: Normal rate and regular rhythm.       Heart sounds: Normal heart sounds. No murmur heard.  Pulmonary:      Effort: Pulmonary effort is normal. No respiratory distress.      Breath sounds: Normal breath sounds. No wheezing.   Musculoskeletal:         General: No tenderness.   Lymphadenopathy:      Cervical: No cervical adenopathy.   Skin:     General: Skin is warm and dry.      Findings: No erythema or rash.   Neurological:      Mental Status: She is alert and oriented to person, place, and time.      Comments: No hand tremor bilaterally   Psychiatric:         Speech: Speech normal.         Behavior: Behavior normal.         Thought Content: Thought content normal.         Labs and Imaging   Lab Results   Component Value Date    TSH 2.670 12/07/2023    O6QTRML 92.1 07/20/2023    THYROIDAB 53 (H) 05/09/2023         Assessment / Plan     Diagnoses and all orders for this visit:    1. Hyperthyroidism (Primary)  -     TSH  -     T4, Free  -     Thyroid Stimulating Immunoglobulin  -     Thyrotropin Receptor Antibody            Problem List Items Addressed This Visit       Hyperthyroidism - Primary    Overview     Hyperthyroidism         Relevant Orders    TSH    T4, Free    Thyroid Stimulating Immunoglobulin    Thyrotropin Receptor Antibody       Diagnosis was discussed and reviewed with the patient including the advantages of drug therapy.        Patient appears clinically euthyroid. Etiology likely Graves' disease with elevated antibodies. Off methimazole since beginning of 12/2023. Repeat TFTs.  Dr. Jacques is following thyroid nodule.    She has a f/u with Dr. Jacques 4/2024    Ludwig Cruz PA-C  Endocrinology

## 2024-02-01 LAB — TSH RECEP AB SER-ACNC: <1.1 IU/L (ref 0–1.75)

## 2024-02-02 LAB — TSI SER-ACNC: 0.74 IU/L (ref 0–0.55)

## 2024-02-27 ENCOUNTER — HOSPITAL ENCOUNTER (OUTPATIENT)
Dept: ULTRASOUND IMAGING | Facility: HOSPITAL | Age: 44
Discharge: HOME | End: 2024-02-27
Payer: COMMERCIAL

## 2024-02-27 ENCOUNTER — HOSPITAL ENCOUNTER (OUTPATIENT)
Dept: MAMMOGRAPHY | Facility: HOSPITAL | Age: 44
Discharge: HOME | End: 2024-02-27
Payer: COMMERCIAL

## 2024-02-27 DIAGNOSIS — R92.8 ABNORMAL MAMMOGRAM: ICD-10-CM

## 2024-02-27 PROCEDURE — 76642 ULTRASOUND BREAST LIMITED: CPT | Performed by: RADIOLOGY

## 2024-02-27 PROCEDURE — 76641 ULTRASOUND BREAST COMPLETE: CPT | Performed by: RADIOLOGY

## 2024-02-27 PROCEDURE — 77062 BREAST TOMOSYNTHESIS BI: CPT | Performed by: RADIOLOGY

## 2024-02-27 PROCEDURE — 76641 ULTRASOUND BREAST COMPLETE: CPT

## 2024-02-27 PROCEDURE — 77066 DX MAMMO INCL CAD BI: CPT

## 2024-02-27 PROCEDURE — G0279 TOMOSYNTHESIS, MAMMO: HCPCS

## 2024-02-27 PROCEDURE — 77066 DX MAMMO INCL CAD BI: CPT | Performed by: RADIOLOGY

## 2024-02-27 PROCEDURE — 76642 ULTRASOUND BREAST LIMITED: CPT

## 2024-04-16 ENCOUNTER — OFFICE VISIT (OUTPATIENT)
Dept: ENDOCRINOLOGY | Facility: CLINIC | Age: 44
End: 2024-04-16
Payer: COMMERCIAL

## 2024-04-16 VITALS
WEIGHT: 146 LBS | HEART RATE: 70 BPM | DIASTOLIC BLOOD PRESSURE: 70 MMHG | HEIGHT: 68 IN | BODY MASS INDEX: 22.13 KG/M2 | SYSTOLIC BLOOD PRESSURE: 128 MMHG

## 2024-04-16 DIAGNOSIS — E05.90 HYPERTHYROIDISM: ICD-10-CM

## 2024-04-16 DIAGNOSIS — E04.1 SOLITARY THYROID NODULE: Primary | ICD-10-CM

## 2024-04-16 LAB
T4 FREE SERPL-MCNC: 1.3 NG/DL (ref 0.93–1.7)
TSH SERPL DL<=0.05 MIU/L-ACNC: 2.31 UIU/ML (ref 0.27–4.2)

## 2024-04-16 PROCEDURE — 99214 OFFICE O/P EST MOD 30 MIN: CPT | Performed by: INTERNAL MEDICINE

## 2024-04-16 PROCEDURE — 76536 US EXAM OF HEAD AND NECK: CPT | Performed by: INTERNAL MEDICINE

## 2024-04-16 PROCEDURE — 84439 ASSAY OF FREE THYROXINE: CPT | Performed by: INTERNAL MEDICINE

## 2024-04-16 PROCEDURE — 84443 ASSAY THYROID STIM HORMONE: CPT | Performed by: INTERNAL MEDICINE

## 2024-04-16 PROCEDURE — 10005 FNA BX W/US GDN 1ST LES: CPT | Performed by: INTERNAL MEDICINE

## 2024-04-16 NOTE — PROGRESS NOTES
"Thyroid Problem    Subjective   Korina Rodriguez is a 43 y.o. female. she is being seen for follow-up. She has been seen by [facility] for hyperthyroidism management.   Hypethyroidism is treated with the methimazole. It was diagnosed in 5/2023 when TSH was < 0.05. She stopped methimazole approximately 6 months ago. Levels remained normal.     Thyroid nodule dx in 5/2023. Ultrasound from 6/2023 reviewed - there is 1.8 x 2 cm left thyroid nodule with TIRADS 5. Nodule is taller than wide. FNA is performed in 09/2023 - non diagnostic specimen.     No sx. She is off the methimazole for 6 months with minimal symptoms.     Medications:    Current Outpatient Medications:     aspirin 81 MG EC tablet, Take 1 tablet by mouth Daily., Disp: , Rfl:       Review of Systems   Constitutional:  Positive for fatigue.       Objective   Vitals:    04/16/24 0847   BP: 128/70   Pulse: 70   Weight: 66.2 kg (146 lb)   Height: 172.7 cm (67.99\")    Body mass index is 22.2 kg/m².   Physical Exam  Vitals reviewed.   Constitutional:       Appearance: Normal appearance.   Neck:      Thyroid: Thyromegaly (left 2 cm nodule.) present.   Cardiovascular:      Rate and Rhythm: Normal rate and regular rhythm.      Pulses: Normal pulses.      Heart sounds: Normal heart sounds.   Pulmonary:      Effort: Pulmonary effort is normal.      Breath sounds: Normal breath sounds.   Musculoskeletal:         General: No swelling.   Neurological:      Mental Status: She is alert and oriented to person, place, and time.   Psychiatric:         Mood and Affect: Mood normal.         Thought Content: Thought content normal.           Results for orders placed or performed in visit on 01/30/24   TSH    Specimen: Blood   Result Value Ref Range    TSH 2.210 0.270 - 4.200 uIU/mL   T4, Free    Specimen: Blood   Result Value Ref Range    Free T4 1.27 0.93 - 1.70 ng/dL   Thyroid Stimulating Immunoglobulin    Specimen: Arm, Left; Blood   Result Value Ref Range    Thyroid Stimulating " Immunoglobulin 0.74 (H) 0.00 - 0.55 IU/L   Thyrotropin Receptor Antibody    Specimen: Blood   Result Value Ref Range    Thyrotropin Receptor Antibody <1.10 0.00 - 1.75 IU/L             Thyroid ultrasound 04/16/24             Real time high resolution imaging of the thyroid gland was performed in transverse and longitudinal planes.   Previous images were reviewed and compared to the current appearance to assess stability.       The right lobe measured 4.34 cm L x 1.75 cm AP x 1.54  cm in TV dimension.    The isthmus measured 0.3 cm in thickness.    The left thyroid lobe measured 4.37 cm L x 1.56 cm AP x 2.55 cm in TV dimension.    Thyroid gland is homogeneous and contains single nodules.    Nodule 1   is located in the left lower lobe and measures 2.46 x 2.25 x 1.23 cm (L X AP X TV).  This nodule is solid, heterogeneous, isoechoic with irregular margins, and Grade II vascularity on Color Flow Doppler.  The nodule is taller than wide and has irregular borders, extrathyroidal growth      No pathologic lymph nodes were seen.    Thyroid Biopsy Procedure Note      Indications: Thyroid Nodule    Anesthesia: ethyl chloride spray    Procedure Details   The procedure, risks and complications have been discussed in detail (including, but not limited to airway compromise, infection, bleeding) with the patient, and the patient has signed consent to the procedure    The neck was prepped in sterile fashion..   Biopsy site: left.  With ultrasound guidance of needle localization,   4  aspirates were obtained with sterile 27 g needles.  A single container with 20 ml of cytology fixative was used to collect needle and syringe washings.   Specimens sent to pathology.   Additional specimen is collected for molecular analysis and will be sent in the event of undetermined biopsy.     The patient tolerated the procedure without difficulty or complications.        Assessment: Left thyroid nodule is biopsied with u/s guidance.   Repeat  ultrasound in 10 months .      Assessment/Plan:    Problem List Items Addressed This Visit          Other    Hyperthyroidism    Overview     Hyperthyroidism         Relevant Orders    TSH    T4, Free    Solitary thyroid nodule - Primary    Overview     FNA 09/08/23 - non diagnostic specimen  (< 6 groups of follicular cells).          Relevant Orders    US Thyroid (Completed)       Old records were reviewed and summarized in HPI section of the note.  Previous ultrasound report was reviewed and compared to current finding.   Left thyroid nodule is biopsied. The appearance and size of the nodule warrants consideration of surgical treatment. Will discuss after cytology is back.     Thyroid function repeated today. She is off methimazole now.      Return in about 10 months (around 2/16/2025) for ultrasound.

## 2024-04-26 DIAGNOSIS — E04.1 SOLITARY THYROID NODULE: Primary | ICD-10-CM

## 2024-05-02 ENCOUNTER — TELEPHONE (OUTPATIENT)
Dept: ENDOCRINOLOGY | Facility: CLINIC | Age: 44
End: 2024-05-02
Payer: COMMERCIAL

## 2024-05-02 NOTE — TELEPHONE ENCOUNTER
----- Message from Susana ARELLANO sent at 5/1/2024  5:07 PM EDT -----  Please call with biopsy results - benign thyroid nodule

## 2024-05-08 ENCOUNTER — PATIENT MESSAGE (OUTPATIENT)
Dept: ENDOCRINOLOGY | Facility: CLINIC | Age: 44
End: 2024-05-08
Payer: COMMERCIAL

## 2024-05-13 DIAGNOSIS — E05.90 HYPERTHYROIDISM: Primary | ICD-10-CM

## 2024-05-31 ENCOUNTER — LAB (OUTPATIENT)
Dept: ENDOCRINOLOGY | Facility: CLINIC | Age: 44
End: 2024-05-31
Payer: COMMERCIAL

## 2024-05-31 DIAGNOSIS — E05.90 HYPERTHYROIDISM: ICD-10-CM

## 2024-05-31 PROCEDURE — 84439 ASSAY OF FREE THYROXINE: CPT | Performed by: INTERNAL MEDICINE

## 2024-05-31 PROCEDURE — 36415 COLL VENOUS BLD VENIPUNCTURE: CPT | Performed by: INTERNAL MEDICINE

## 2024-05-31 PROCEDURE — 84443 ASSAY THYROID STIM HORMONE: CPT | Performed by: INTERNAL MEDICINE

## 2024-06-01 LAB
T4 FREE SERPL-MCNC: 1.27 NG/DL (ref 0.92–1.68)
TSH SERPL DL<=0.05 MIU/L-ACNC: 1.54 UIU/ML (ref 0.27–4.2)

## 2024-08-02 ENCOUNTER — LAB (OUTPATIENT)
Dept: ENDOCRINOLOGY | Facility: CLINIC | Age: 44
End: 2024-08-02
Payer: COMMERCIAL

## 2024-08-02 DIAGNOSIS — E05.90 HYPERTHYROIDISM: ICD-10-CM

## 2024-08-02 LAB
T4 FREE SERPL-MCNC: 1.24 NG/DL (ref 0.92–1.68)
TSH SERPL DL<=0.05 MIU/L-ACNC: 1.61 UIU/ML (ref 0.27–4.2)

## 2024-08-02 PROCEDURE — 36415 COLL VENOUS BLD VENIPUNCTURE: CPT | Performed by: INTERNAL MEDICINE

## 2024-08-02 PROCEDURE — 84443 ASSAY THYROID STIM HORMONE: CPT | Performed by: INTERNAL MEDICINE

## 2024-08-02 PROCEDURE — 84439 ASSAY OF FREE THYROXINE: CPT | Performed by: INTERNAL MEDICINE

## 2024-08-26 ENCOUNTER — PRE-ADMISSION TESTING (OUTPATIENT)
Dept: PREADMISSION TESTING | Facility: HOSPITAL | Age: 44
End: 2024-08-26
Payer: COMMERCIAL

## 2024-08-26 VITALS — HEIGHT: 68 IN | BODY MASS INDEX: 23.09 KG/M2 | WEIGHT: 152.34 LBS

## 2024-08-26 LAB
DEPRECATED RDW RBC AUTO: 42.9 FL (ref 37–54)
ERYTHROCYTE [DISTWIDTH] IN BLOOD BY AUTOMATED COUNT: 13.2 % (ref 12.3–15.4)
HCT VFR BLD AUTO: 40 % (ref 34–46.6)
HGB BLD-MCNC: 13.2 G/DL (ref 12–15.9)
INR PPP: 0.98 (ref 0.89–1.12)
MCH RBC QN AUTO: 29.2 PG (ref 26.6–33)
MCHC RBC AUTO-ENTMCNC: 33 G/DL (ref 31.5–35.7)
MCV RBC AUTO: 88.5 FL (ref 79–97)
PLATELET # BLD AUTO: 213 10*3/MM3 (ref 140–450)
PMV BLD AUTO: 9.5 FL (ref 6–12)
PROTHROMBIN TIME: 13.1 SECONDS (ref 12.2–14.5)
RBC # BLD AUTO: 4.52 10*6/MM3 (ref 3.77–5.28)
WBC NRBC COR # BLD AUTO: 8.91 10*3/MM3 (ref 3.4–10.8)

## 2024-08-26 PROCEDURE — 85610 PROTHROMBIN TIME: CPT

## 2024-08-26 PROCEDURE — 36415 COLL VENOUS BLD VENIPUNCTURE: CPT

## 2024-08-26 PROCEDURE — 85027 COMPLETE CBC AUTOMATED: CPT

## 2024-08-26 NOTE — PAT
An arrival time for procedure was not provided during PAT visit. If patient had any questions or concerns about their arrival time, they were instructed to contact their surgeon/physician.  Additionally, if the patient referred to an arrival time that was acquired from their my chart account, patient was encouraged to verify that time with their surgeon/physician. Arrival times are NOT provided in Pre Admission Testing Department.    Patient viewed general PAT education video as instructed in their preoperative information received from their surgeon.  Patient stated the general PAT education video was viewed in its entirety and survey completed.  Copies of PAT general education handouts (Incentive Spirometry, Meds to Beds Program, Patient Belongings, Pre-op skin preparation instructions, Blood Glucose testing, Visitor policy, Surgery FAQ, Code H) distributed to patient if not printed. Education related to the PAT pass and skin preparation for surgery (if applicable) completed in PAT as a reinforcement to PAT education video. Patient instructed to return PAT pass provided today as well as completed skin preparation sheet (if applicable) on the day of procedure.     Additionally if patient had not viewed video yet but intended to view it at home or in our waiting area, then referred them to the handout with QR code/link provided during PAT visit.  Encouraged patient/family to read PAT general education handouts thoroughly and notify PAT staff with any questions or concerns. Patient verbalized understanding of all information and priority content.    Patient denies any current skin issues.     Patient to apply Chlorhexadine wipes  to surgical area (as instructed) the night before procedure and the AM of procedure. Wipes provided.

## 2024-08-28 ENCOUNTER — ANESTHESIA EVENT (OUTPATIENT)
Dept: PERIOP | Facility: HOSPITAL | Age: 44
End: 2024-08-28
Payer: COMMERCIAL

## 2024-08-28 RX ORDER — SODIUM CHLORIDE 0.9 % (FLUSH) 0.9 %
10 SYRINGE (ML) INJECTION AS NEEDED
Status: CANCELLED | OUTPATIENT
Start: 2024-08-28

## 2024-08-28 RX ORDER — FAMOTIDINE 10 MG/ML
20 INJECTION, SOLUTION INTRAVENOUS ONCE
Status: CANCELLED | OUTPATIENT
Start: 2024-08-28 | End: 2024-08-28

## 2024-08-28 RX ORDER — SODIUM CHLORIDE 0.9 % (FLUSH) 0.9 %
10 SYRINGE (ML) INJECTION EVERY 12 HOURS SCHEDULED
Status: CANCELLED | OUTPATIENT
Start: 2024-08-28

## 2024-08-28 RX ORDER — SODIUM CHLORIDE 9 MG/ML
40 INJECTION, SOLUTION INTRAVENOUS AS NEEDED
Status: CANCELLED | OUTPATIENT
Start: 2024-08-28

## 2024-08-29 ENCOUNTER — HOSPITAL ENCOUNTER (OUTPATIENT)
Facility: HOSPITAL | Age: 44
Setting detail: HOSPITAL OUTPATIENT SURGERY
Discharge: HOME | End: 2024-08-29
Attending: SURGERY | Admitting: SURGERY
Payer: COMMERCIAL

## 2024-08-29 ENCOUNTER — ANESTHESIA (OUTPATIENT)
Dept: PERIOP | Facility: HOSPITAL | Age: 44
End: 2024-08-29
Payer: COMMERCIAL

## 2024-08-29 VITALS
HEIGHT: 68 IN | RESPIRATION RATE: 10 BRPM | HEART RATE: 79 BPM | SYSTOLIC BLOOD PRESSURE: 108 MMHG | OXYGEN SATURATION: 97 % | DIASTOLIC BLOOD PRESSURE: 62 MMHG | BODY MASS INDEX: 23.04 KG/M2 | TEMPERATURE: 98 F | WEIGHT: 152 LBS

## 2024-08-29 DIAGNOSIS — E05.90 HYPERTHYROIDISM: ICD-10-CM

## 2024-08-29 DIAGNOSIS — E04.1 SOLITARY THYROID NODULE: Primary | ICD-10-CM

## 2024-08-29 LAB
B-HCG UR QL: NEGATIVE
EXPIRATION DATE: NORMAL
INTERNAL NEGATIVE CONTROL: NEGATIVE
INTERNAL POSITIVE CONTROL: POSITIVE
Lab: NORMAL

## 2024-08-29 PROCEDURE — 25010000002 FENTANYL CITRATE (PF) 100 MCG/2ML SOLUTION

## 2024-08-29 PROCEDURE — 25010000002 HYDROMORPHONE PER 4 MG

## 2024-08-29 PROCEDURE — S0260 H&P FOR SURGERY: HCPCS | Performed by: PHYSICIAN ASSISTANT

## 2024-08-29 PROCEDURE — 81025 URINE PREGNANCY TEST: CPT | Performed by: ANESTHESIOLOGY

## 2024-08-29 PROCEDURE — 25810000003 LACTATED RINGERS PER 1000 ML: Performed by: ANESTHESIOLOGY

## 2024-08-29 PROCEDURE — 88307 TISSUE EXAM BY PATHOLOGIST: CPT | Performed by: SURGERY

## 2024-08-29 PROCEDURE — 25010000002 PROPOFOL 10 MG/ML EMULSION

## 2024-08-29 PROCEDURE — 25010000002 HEPARIN (PORCINE) PER 1000 UNITS: Performed by: SURGERY

## 2024-08-29 PROCEDURE — 25010000002 DEXAMETHASONE PER 1 MG

## 2024-08-29 PROCEDURE — 25010000002 FENTANYL CITRATE (PF) 50 MCG/ML SOLUTION

## 2024-08-29 PROCEDURE — 25010000002 MIDAZOLAM PER 1 MG: Performed by: ANESTHESIOLOGY

## 2024-08-29 PROCEDURE — 60220 PARTIAL REMOVAL OF THYROID: CPT | Performed by: PHYSICIAN ASSISTANT

## 2024-08-29 PROCEDURE — 25010000002 ONDANSETRON PER 1 MG

## 2024-08-29 DEVICE — ABSORBABLE HEMOSTAT (OXIDIZED REGENERATED CELLULOSE, U.S.P.)
Type: IMPLANTABLE DEVICE | Site: THYROID | Status: FUNCTIONAL
Brand: SURGICEL

## 2024-08-29 DEVICE — HORIZON TI SMALL 6 CLIPS/CART
Type: IMPLANTABLE DEVICE | Site: THYROID | Status: FUNCTIONAL
Brand: WECK

## 2024-08-29 DEVICE — MEDIUM TITANIUM CLIPS - 1 CARTRIDGE/1 POUCH
Type: IMPLANTABLE DEVICE | Site: THYROID | Status: FUNCTIONAL
Brand: TELEFLEX

## 2024-08-29 RX ORDER — LIDOCAINE HYDROCHLORIDE 10 MG/ML
0.5 INJECTION, SOLUTION EPIDURAL; INFILTRATION; INTRACAUDAL; PERINEURAL ONCE AS NEEDED
Status: DISCONTINUED | OUTPATIENT
Start: 2024-08-29 | End: 2024-08-29

## 2024-08-29 RX ORDER — FAMOTIDINE 10 MG/ML
20 INJECTION, SOLUTION INTRAVENOUS ONCE
Status: DISCONTINUED | OUTPATIENT
Start: 2024-08-29 | End: 2024-08-29 | Stop reason: HOSPADM

## 2024-08-29 RX ORDER — DROPERIDOL 2.5 MG/ML
0.62 INJECTION, SOLUTION INTRAMUSCULAR; INTRAVENOUS
Status: DISCONTINUED | OUTPATIENT
Start: 2024-08-29 | End: 2024-08-29 | Stop reason: HOSPADM

## 2024-08-29 RX ORDER — SODIUM CHLORIDE, SODIUM LACTATE, POTASSIUM CHLORIDE, CALCIUM CHLORIDE 600; 310; 30; 20 MG/100ML; MG/100ML; MG/100ML; MG/100ML
9 INJECTION, SOLUTION INTRAVENOUS CONTINUOUS
Status: DISCONTINUED | OUTPATIENT
Start: 2024-08-29 | End: 2024-08-29 | Stop reason: HOSPADM

## 2024-08-29 RX ORDER — OXYCODONE AND ACETAMINOPHEN 5; 325 MG/1; MG/1
1 TABLET ORAL EVERY 6 HOURS PRN
Qty: 10 TABLET | Refills: 0 | Status: SHIPPED | OUTPATIENT
Start: 2024-08-29

## 2024-08-29 RX ORDER — MIDAZOLAM HYDROCHLORIDE 1 MG/ML
1 INJECTION INTRAMUSCULAR; INTRAVENOUS
Status: DISCONTINUED | OUTPATIENT
Start: 2024-08-29 | End: 2024-08-29 | Stop reason: HOSPADM

## 2024-08-29 RX ORDER — SODIUM CHLORIDE 9 MG/ML
40 INJECTION, SOLUTION INTRAVENOUS AS NEEDED
Status: DISCONTINUED | OUTPATIENT
Start: 2024-08-29 | End: 2024-08-29 | Stop reason: HOSPADM

## 2024-08-29 RX ORDER — FENTANYL CITRATE 50 UG/ML
INJECTION, SOLUTION INTRAMUSCULAR; INTRAVENOUS
Status: COMPLETED
Start: 2024-08-29 | End: 2024-08-29

## 2024-08-29 RX ORDER — MIDAZOLAM HYDROCHLORIDE 1 MG/ML
1 INJECTION INTRAMUSCULAR; INTRAVENOUS
Status: DISCONTINUED | OUTPATIENT
Start: 2024-08-29 | End: 2024-08-29

## 2024-08-29 RX ORDER — SODIUM CHLORIDE 9 MG/ML
40 INJECTION, SOLUTION INTRAVENOUS AS NEEDED
Status: DISCONTINUED | OUTPATIENT
Start: 2024-08-29 | End: 2024-08-29 | Stop reason: SDUPTHER

## 2024-08-29 RX ORDER — SODIUM CHLORIDE 0.9 % (FLUSH) 0.9 %
10 SYRINGE (ML) INJECTION EVERY 12 HOURS SCHEDULED
Status: DISCONTINUED | OUTPATIENT
Start: 2024-08-29 | End: 2024-08-29 | Stop reason: HOSPADM

## 2024-08-29 RX ORDER — FENTANYL CITRATE 50 UG/ML
INJECTION, SOLUTION INTRAMUSCULAR; INTRAVENOUS AS NEEDED
Status: DISCONTINUED | OUTPATIENT
Start: 2024-08-29 | End: 2024-08-29 | Stop reason: SURG

## 2024-08-29 RX ORDER — MIDAZOLAM HYDROCHLORIDE 1 MG/ML
2 INJECTION INTRAMUSCULAR; INTRAVENOUS ONCE
Status: COMPLETED | OUTPATIENT
Start: 2024-08-29 | End: 2024-08-29

## 2024-08-29 RX ORDER — FENTANYL CITRATE 50 UG/ML
50 INJECTION, SOLUTION INTRAMUSCULAR; INTRAVENOUS
Status: DISCONTINUED | OUTPATIENT
Start: 2024-08-29 | End: 2024-08-29 | Stop reason: HOSPADM

## 2024-08-29 RX ORDER — HEPARIN SODIUM 5000 [USP'U]/ML
5000 INJECTION, SOLUTION INTRAVENOUS; SUBCUTANEOUS ONCE
Status: COMPLETED | OUTPATIENT
Start: 2024-08-29 | End: 2024-08-29

## 2024-08-29 RX ORDER — NALOXONE HCL 0.4 MG/ML
0.4 VIAL (ML) INJECTION AS NEEDED
Status: DISCONTINUED | OUTPATIENT
Start: 2024-08-29 | End: 2024-08-29 | Stop reason: HOSPADM

## 2024-08-29 RX ORDER — OXYCODONE AND ACETAMINOPHEN 5; 325 MG/1; MG/1
1 TABLET ORAL ONCE AS NEEDED
Status: DISCONTINUED | OUTPATIENT
Start: 2024-08-29 | End: 2024-08-29 | Stop reason: HOSPADM

## 2024-08-29 RX ORDER — PROMETHAZINE HYDROCHLORIDE 25 MG/1
25 TABLET ORAL ONCE AS NEEDED
Status: DISCONTINUED | OUTPATIENT
Start: 2024-08-29 | End: 2024-08-29 | Stop reason: HOSPADM

## 2024-08-29 RX ORDER — BUPIVACAINE HYDROCHLORIDE AND EPINEPHRINE 2.5; 5 MG/ML; UG/ML
INJECTION, SOLUTION INFILTRATION; PERINEURAL AS NEEDED
Status: DISCONTINUED | OUTPATIENT
Start: 2024-08-29 | End: 2024-08-29 | Stop reason: HOSPADM

## 2024-08-29 RX ORDER — LABETALOL HYDROCHLORIDE 5 MG/ML
5 INJECTION, SOLUTION INTRAVENOUS
Status: DISCONTINUED | OUTPATIENT
Start: 2024-08-29 | End: 2024-08-29 | Stop reason: HOSPADM

## 2024-08-29 RX ORDER — SODIUM CHLORIDE 0.9 % (FLUSH) 0.9 %
10 SYRINGE (ML) INJECTION AS NEEDED
Status: DISCONTINUED | OUTPATIENT
Start: 2024-08-29 | End: 2024-08-29 | Stop reason: HOSPADM

## 2024-08-29 RX ORDER — FAMOTIDINE 20 MG/1
20 TABLET, FILM COATED ORAL ONCE
Status: COMPLETED | OUTPATIENT
Start: 2024-08-29 | End: 2024-08-29

## 2024-08-29 RX ORDER — SODIUM CHLORIDE, SODIUM LACTATE, POTASSIUM CHLORIDE, CALCIUM CHLORIDE 600; 310; 30; 20 MG/100ML; MG/100ML; MG/100ML; MG/100ML
9 INJECTION, SOLUTION INTRAVENOUS CONTINUOUS
Status: DISCONTINUED | OUTPATIENT
Start: 2024-08-29 | End: 2024-08-29

## 2024-08-29 RX ORDER — DOCUSATE SODIUM 100 MG/1
100 CAPSULE, LIQUID FILLED ORAL 2 TIMES DAILY PRN
Qty: 30 CAPSULE | Refills: 1 | Status: SHIPPED | OUTPATIENT
Start: 2024-08-29 | End: 2025-08-29

## 2024-08-29 RX ORDER — FAMOTIDINE 20 MG/1
20 TABLET, FILM COATED ORAL ONCE
Status: DISCONTINUED | OUTPATIENT
Start: 2024-08-29 | End: 2024-08-29

## 2024-08-29 RX ORDER — LIDOCAINE HYDROCHLORIDE 10 MG/ML
INJECTION, SOLUTION EPIDURAL; INFILTRATION; INTRACAUDAL; PERINEURAL AS NEEDED
Status: DISCONTINUED | OUTPATIENT
Start: 2024-08-29 | End: 2024-08-29 | Stop reason: SURG

## 2024-08-29 RX ORDER — LIDOCAINE HYDROCHLORIDE 10 MG/ML
0.5 INJECTION, SOLUTION EPIDURAL; INFILTRATION; INTRACAUDAL; PERINEURAL ONCE AS NEEDED
Status: COMPLETED | OUTPATIENT
Start: 2024-08-29 | End: 2024-08-29

## 2024-08-29 RX ORDER — ONDANSETRON 2 MG/ML
4 INJECTION INTRAMUSCULAR; INTRAVENOUS ONCE AS NEEDED
Status: DISCONTINUED | OUTPATIENT
Start: 2024-08-29 | End: 2024-08-29 | Stop reason: SDUPTHER

## 2024-08-29 RX ORDER — IPRATROPIUM BROMIDE AND ALBUTEROL SULFATE 2.5; .5 MG/3ML; MG/3ML
3 SOLUTION RESPIRATORY (INHALATION) ONCE AS NEEDED
Status: DISCONTINUED | OUTPATIENT
Start: 2024-08-29 | End: 2024-08-29 | Stop reason: HOSPADM

## 2024-08-29 RX ORDER — SODIUM CHLORIDE 0.9 % (FLUSH) 0.9 %
3 SYRINGE (ML) INJECTION EVERY 12 HOURS SCHEDULED
Status: DISCONTINUED | OUTPATIENT
Start: 2024-08-29 | End: 2024-08-29 | Stop reason: HOSPADM

## 2024-08-29 RX ORDER — DEXMEDETOMIDINE HYDROCHLORIDE 100 UG/ML
INJECTION, SOLUTION INTRAVENOUS AS NEEDED
Status: DISCONTINUED | OUTPATIENT
Start: 2024-08-29 | End: 2024-08-29 | Stop reason: SURG

## 2024-08-29 RX ORDER — MEPERIDINE HYDROCHLORIDE 25 MG/ML
12.5 INJECTION INTRAMUSCULAR; INTRAVENOUS; SUBCUTANEOUS
Status: DISCONTINUED | OUTPATIENT
Start: 2024-08-29 | End: 2024-08-29 | Stop reason: HOSPADM

## 2024-08-29 RX ORDER — HYDRALAZINE HYDROCHLORIDE 20 MG/ML
5 INJECTION INTRAMUSCULAR; INTRAVENOUS
Status: DISCONTINUED | OUTPATIENT
Start: 2024-08-29 | End: 2024-08-29 | Stop reason: HOSPADM

## 2024-08-29 RX ORDER — PHENYLEPHRINE HCL IN 0.9% NACL 1 MG/10 ML
SYRINGE (ML) INTRAVENOUS AS NEEDED
Status: DISCONTINUED | OUTPATIENT
Start: 2024-08-29 | End: 2024-08-29 | Stop reason: SURG

## 2024-08-29 RX ORDER — ONDANSETRON 2 MG/ML
INJECTION INTRAMUSCULAR; INTRAVENOUS AS NEEDED
Status: DISCONTINUED | OUTPATIENT
Start: 2024-08-29 | End: 2024-08-29 | Stop reason: SURG

## 2024-08-29 RX ORDER — OXYCODONE AND ACETAMINOPHEN 5; 325 MG/1; MG/1
TABLET ORAL
Status: COMPLETED
Start: 2024-08-29 | End: 2024-08-29

## 2024-08-29 RX ORDER — PROPOFOL 10 MG/ML
VIAL (ML) INTRAVENOUS AS NEEDED
Status: DISCONTINUED | OUTPATIENT
Start: 2024-08-29 | End: 2024-08-29 | Stop reason: SURG

## 2024-08-29 RX ORDER — DEXAMETHASONE SODIUM PHOSPHATE 4 MG/ML
INJECTION, SOLUTION INTRA-ARTICULAR; INTRALESIONAL; INTRAMUSCULAR; INTRAVENOUS; SOFT TISSUE AS NEEDED
Status: DISCONTINUED | OUTPATIENT
Start: 2024-08-29 | End: 2024-08-29 | Stop reason: SURG

## 2024-08-29 RX ORDER — SUCCINYLCHOLINE/SOD CL,ISO/PF 200MG/10ML
SYRINGE (ML) INTRAVENOUS AS NEEDED
Status: DISCONTINUED | OUTPATIENT
Start: 2024-08-29 | End: 2024-08-29 | Stop reason: SURG

## 2024-08-29 RX ORDER — PROMETHAZINE HYDROCHLORIDE 25 MG/1
25 SUPPOSITORY RECTAL ONCE AS NEEDED
Status: DISCONTINUED | OUTPATIENT
Start: 2024-08-29 | End: 2024-08-29 | Stop reason: HOSPADM

## 2024-08-29 RX ORDER — ROCURONIUM BROMIDE 10 MG/ML
INJECTION, SOLUTION INTRAVENOUS AS NEEDED
Status: DISCONTINUED | OUTPATIENT
Start: 2024-08-29 | End: 2024-08-29 | Stop reason: SURG

## 2024-08-29 RX ORDER — HYDROCODONE BITARTRATE AND ACETAMINOPHEN 5; 325 MG/1; MG/1
1 TABLET ORAL ONCE AS NEEDED
Status: DISCONTINUED | OUTPATIENT
Start: 2024-08-29 | End: 2024-08-29 | Stop reason: HOSPADM

## 2024-08-29 RX ORDER — HYDROMORPHONE HYDROCHLORIDE 1 MG/ML
0.5 INJECTION, SOLUTION INTRAMUSCULAR; INTRAVENOUS; SUBCUTANEOUS
Status: DISCONTINUED | OUTPATIENT
Start: 2024-08-29 | End: 2024-08-29 | Stop reason: HOSPADM

## 2024-08-29 RX ORDER — ONDANSETRON 2 MG/ML
4 INJECTION INTRAMUSCULAR; INTRAVENOUS ONCE AS NEEDED
Status: DISCONTINUED | OUTPATIENT
Start: 2024-08-29 | End: 2024-08-29 | Stop reason: HOSPADM

## 2024-08-29 RX ORDER — LEVOTHYROXINE SODIUM 50 UG/1
50 TABLET ORAL
Qty: 30 TABLET | Refills: 2 | Status: SHIPPED | OUTPATIENT
Start: 2024-08-29 | End: 2024-11-27

## 2024-08-29 RX ORDER — SODIUM CHLORIDE 0.9 % (FLUSH) 0.9 %
3-10 SYRINGE (ML) INJECTION AS NEEDED
Status: DISCONTINUED | OUTPATIENT
Start: 2024-08-29 | End: 2024-08-29 | Stop reason: HOSPADM

## 2024-08-29 RX ADMIN — PROPOFOL 30 MG: 10 INJECTION, EMULSION INTRAVENOUS at 09:15

## 2024-08-29 RX ADMIN — FENTANYL CITRATE 50 MCG: 50 INJECTION, SOLUTION INTRAMUSCULAR; INTRAVENOUS at 09:51

## 2024-08-29 RX ADMIN — Medication 50 MCG: at 09:09

## 2024-08-29 RX ADMIN — FENTANYL CITRATE 50 MCG: 50 INJECTION, SOLUTION INTRAMUSCULAR; INTRAVENOUS at 07:37

## 2024-08-29 RX ADMIN — DEXMEDETOMIDINE HYDROCHLORIDE 4 MCG: 100 INJECTION, SOLUTION INTRAVENOUS at 08:01

## 2024-08-29 RX ADMIN — FAMOTIDINE 20 MG: 20 TABLET, FILM COATED ORAL at 07:00

## 2024-08-29 RX ADMIN — LIDOCAINE HYDROCHLORIDE 50 MG: 10 INJECTION, SOLUTION EPIDURAL; INFILTRATION; INTRACAUDAL; PERINEURAL at 07:37

## 2024-08-29 RX ADMIN — Medication 50 MCG: at 08:08

## 2024-08-29 RX ADMIN — ROCURONIUM BROMIDE 5 MG: 10 INJECTION INTRAVENOUS at 07:37

## 2024-08-29 RX ADMIN — DEXMEDETOMIDINE HYDROCHLORIDE 4 MCG: 100 INJECTION, SOLUTION INTRAVENOUS at 07:37

## 2024-08-29 RX ADMIN — Medication 50 MCG: at 08:37

## 2024-08-29 RX ADMIN — Medication 50 MCG: at 07:52

## 2024-08-29 RX ADMIN — FENTANYL CITRATE 50 MCG: 50 INJECTION, SOLUTION INTRAMUSCULAR; INTRAVENOUS at 10:39

## 2024-08-29 RX ADMIN — FENTANYL CITRATE 50 MCG: 50 INJECTION, SOLUTION INTRAMUSCULAR; INTRAVENOUS at 09:15

## 2024-08-29 RX ADMIN — DEXAMETHASONE SODIUM PHOSPHATE 8 MG: 4 INJECTION INTRA-ARTICULAR; INTRALESIONAL; INTRAMUSCULAR; INTRAVENOUS; SOFT TISSUE at 07:37

## 2024-08-29 RX ADMIN — Medication 50 MCG: at 08:48

## 2024-08-29 RX ADMIN — DEXMEDETOMIDINE HYDROCHLORIDE 4 MCG: 100 INJECTION, SOLUTION INTRAVENOUS at 08:21

## 2024-08-29 RX ADMIN — HEPARIN SODIUM 5000 UNITS: 5000 INJECTION INTRAVENOUS; SUBCUTANEOUS at 07:26

## 2024-08-29 RX ADMIN — ONDANSETRON 4 MG: 2 INJECTION INTRAMUSCULAR; INTRAVENOUS at 07:34

## 2024-08-29 RX ADMIN — OXYCODONE AND ACETAMINOPHEN 1 TABLET: 5; 325 TABLET ORAL at 10:59

## 2024-08-29 RX ADMIN — LIDOCAINE HYDROCHLORIDE 0.5 ML: 10 INJECTION, SOLUTION EPIDURAL; INFILTRATION; INTRACAUDAL; PERINEURAL at 06:55

## 2024-08-29 RX ADMIN — PROPOFOL 50 MG: 10 INJECTION, EMULSION INTRAVENOUS at 07:42

## 2024-08-29 RX ADMIN — Medication 50 MCG: at 08:30

## 2024-08-29 RX ADMIN — Medication 120 MG: at 07:37

## 2024-08-29 RX ADMIN — MIDAZOLAM HYDROCHLORIDE 2 MG: 1 INJECTION, SOLUTION INTRAMUSCULAR; INTRAVENOUS at 07:26

## 2024-08-29 RX ADMIN — Medication 50 MCG: at 07:57

## 2024-08-29 RX ADMIN — SODIUM CHLORIDE, POTASSIUM CHLORIDE, SODIUM LACTATE AND CALCIUM CHLORIDE 9 ML/HR: 600; 310; 30; 20 INJECTION, SOLUTION INTRAVENOUS at 06:55

## 2024-08-29 RX ADMIN — PROPOFOL 200 MG: 10 INJECTION, EMULSION INTRAVENOUS at 07:37

## 2024-08-29 RX ADMIN — HYDROMORPHONE HYDROCHLORIDE 0.5 MG: 1 INJECTION, SOLUTION INTRAMUSCULAR; INTRAVENOUS; SUBCUTANEOUS at 10:06

## 2024-08-29 RX ADMIN — DEXMEDETOMIDINE HYDROCHLORIDE 4 MCG: 100 INJECTION, SOLUTION INTRAVENOUS at 08:24

## 2024-08-29 RX ADMIN — OXYCODONE HYDROCHLORIDE AND ACETAMINOPHEN 1 TABLET: 5; 325 TABLET ORAL at 10:59

## 2024-08-29 RX ADMIN — DEXMEDETOMIDINE HYDROCHLORIDE 4 MCG: 100 INJECTION, SOLUTION INTRAVENOUS at 08:15

## 2024-08-29 RX ADMIN — SODIUM CHLORIDE, POTASSIUM CHLORIDE, SODIUM LACTATE AND CALCIUM CHLORIDE: 600; 310; 30; 20 INJECTION, SOLUTION INTRAVENOUS at 09:01

## 2024-08-29 NOTE — BRIEF OP NOTE
THYROID LOBECTOMY  Progress Note    Korina Rodriguez  8/29/2024    Pre-op Diagnosis:    * Toxic thyroid nodule        Post-Op Diagnosis Codes:     * Toxic thyroid nodule     Procedure/CPT® Codes:        Procedure(s):  THYROID LOBECTOMY LEFT              Surgeon(s):  Jorje Hermosillo MD    Anesthesia: General    Staff:   Circulator: Nba Dempsey RN  Scrub Person: Amol Willis RN  Nursing Assistant: Melissa Galo PCT  Assistant: Last Dominguez PA  Assistant: Last Dominguez PA      Estimated Blood Loss: minimal    Urine Voided: * No values recorded between 8/29/2024  7:32 AM and 8/29/2024  9:07 AM *    Specimens:                Specimens       ID Source Type Tests Collected By Collected At [facility]?    A Thyroid Tissue TISSUE PATHOLOGY EXAM   Jorje Hermosillo MD 8/29/24 0819 No    Description: LEFT THYROID LOBE FOR PERMANENT    This specimen was not marked as sent.                  Drains: * No LDAs found *    Findings: Left thyroid nodule growing posterior to the left recurrent laryngeal nerve removed completely grossly        Complications: None    Assistant: Last Dominguez PA  was responsible for performing the following activities: Retraction, Suction, Suturing, Closing, and Placing Dressing and their skilled assistance was necessary for the success of this case.    Jorje Hermosillo MD     Date: 8/29/2024  Time: 09:11 EDT

## 2024-08-29 NOTE — H&P
"Pre-Op H&P  Korina Rodriguez  [id number]  1980    Chief complaint: \"I have a thyroid nodule\"    HPI:    Patient is a 43 y.o.female who presents with a known left thyroid nodule.  Underwent an FNA which was nondiagnostic followed by ultrasound.  She has mild hyperthyroidism.  She is now admitted for left thyroidectomy.    Review of Systems:  General ROS: negative for chills, fever or skin lesions;  No changes since last office visit.  Neg for recent sick exposure  Cardiovascular ROS: no chest pain or dyspnea on exertion  Respiratory ROS: no cough, shortness of breath, or wheezing    Allergies: No Known Allergies    Home Meds:    No current facility-administered medications on file prior to encounter.     Current Outpatient Medications on File Prior to Encounter   Medication Sig Dispense Refill    aspirin 81 MG EC tablet Take 1 tablet by mouth Daily.         PMH:   Past Medical History:   Diagnosis Date    Factor V Leiden     Hyperthyroidism 5/12/23    Hyperthyroidism    Hyperthyroidism 09/08/2023    Hyperthyroidism    Screening breast examination     self admits    Solitary thyroid nodule 09/12/2023     PSH:  No past surgical history on file.  Patient denies allergy to contrast dye or latex  Immunization History:  Influenza: Yes  Pneumococcal: No  Tetanus: Up-to-date    Social History:   Tobacco:   Social History     Tobacco Use   Smoking Status Never   Smokeless Tobacco Never      Alcohol:     Social History     Substance and Sexual Activity   Alcohol Use Not Currently    Comment: occasional/no abuse (current some day)       Vitals:           There were no vitals taken for this visit.    Physical Exam:  General Appearance:    Alert, cooperative, no distress, appears stated age   Head:    Normocephalic, without obvious abnormality, atraumatic   Lungs:   Auscultation bilaterally to the bases    Heart: S1-S2 without rubs murmurs or gallops    Abdomen:  Soft, nontender, bowel sounds present throughout   Breast " Exam:    deferred   Genitalia:    deferred   Extremities:   Extremities normal, atraumatic, no cyanosis or edema   Skin:   Skin color, texture, turgor normal, no rashes or lesions   Neurologic:   Grossly intact   Results Review  LABS:  Lab Results   Component Value Date    WBC 8.91 08/26/2024    HGB 13.2 08/26/2024    HCT 40.0 08/26/2024    MCV 88.5 08/26/2024     08/26/2024    NEUTROABS 4.06 07/20/2023    GLUCOSE 89 07/20/2023    BUN 12 07/20/2023    CREATININE 0.89 07/20/2023     07/20/2023    K 3.9 07/20/2023     07/20/2023    CO2 27.0 07/20/2023    CALCIUM 9.3 07/20/2023    ALBUMIN 4.7 07/20/2023    AST 21 07/20/2023    ALT 26 07/20/2023    BILITOT 0.2 07/20/2023    INR 0.98 08/26/2024       RADIOLOGY:  No radiology results for the last 3 days     I reviewed the patient's new clinical results.    Cancer Staging (if applicable)  Cancer Patient: __ yes __no __unknown; If yes, clinical stage T:__ N:__M:__, stage group or __N/A    Impression: Left thyroid nodule    Plan: Left thyroid lobectomy      SABRINA Layne   08/29/24   6:46 AM EDT

## 2024-08-29 NOTE — ANESTHESIA POSTPROCEDURE EVALUATION
Patient: Korina Rodriguez    Procedure Summary       Date: 08/29/24 Room / Location:  [facility]    Anesthesia Start: 0732 Anesthesia Stop: 0926    Procedure: THYROID LOBECTOMY LEFT (Left: Neck) Diagnosis: Toxic thyroid nodule    Surgeons: Jorje Hermosillo MD Provider: Isma Orlando MD    Anesthesia Type: general ASA Status: 2            Anesthesia Type: general    Vitals  Vitals Value Taken Time   /91 08/29/24 0926   Temp 97.4 °F (36.3 °C) 08/29/24 0926   Pulse 61 08/29/24 0926   Resp 12 08/29/24 0926   SpO2 99 % 08/29/24 0926           Post Anesthesia Care and Evaluation    Patient location during evaluation: PACU  Patient participation: waiting for patient participation  Level of consciousness: lethargic  Pain management: adequate    Airway patency: patent  Anesthetic complications: No anesthetic complications  PONV Status: none  Cardiovascular status: hemodynamically stable and acceptable  Respiratory status: nonlabored ventilation, acceptable, oral airway and nasal cannula  Hydration status: acceptable

## 2024-08-29 NOTE — ANESTHESIA PREPROCEDURE EVALUATION
Anesthesia Evaluation     Patient summary reviewed and Nursing notes reviewed   no history of anesthetic complications:   NPO Solid Status: > 8 hours  NPO Liquid Status: > 8 hours           Airway   Mallampati: II  TM distance: >3 FB  Neck ROM: full  Possible difficult intubation and Small opening  Dental      Pulmonary - negative pulmonary ROS and normal exam   Cardiovascular - negative cardio ROS and normal exam        Neuro/Psych- negative ROS  GI/Hepatic/Renal/Endo    (+) thyroid problem     Musculoskeletal     Abdominal    Substance History      OB/GYN          Other                    Anesthesia Plan    ASA 2     general     (Asleep fob)  intravenous induction     Anesthetic plan, risks, benefits, and alternatives have been provided, discussed and informed consent has been obtained with: patient.    Plan discussed with CRNA.    CODE STATUS:

## 2024-08-29 NOTE — OP NOTE
General Surgery Operative Note    THYROID LOBECTOMY  Procedure Report    Patient Name:  Korina Rodriguez  YOB: 1980  [id number]     Date of Surgery:  8/29/2024       Pre-op Diagnosis: Toxic thyroid nodule    Post-op Diagnosis: Toxic thyroid nodule    Procedure(s):  THYROID LOBECTOMY LEFT    Surgeon: Jorje Hermosillo MD    Assistant: Last Dominguez PA     Anesthesia: General         Estimated Blood Loss: minimal    Complications: None     Implants:    Implant Name Type Inv. Item Serial No.  Lot No. LRB No. Used Action   CLIP LIGAT VASC HORIZON TI MD TANISHA 6CT - [id number] Implant CLIP LIGAT VASC HORIZON TI MD TANISHA 6CT  [employer] [id number] Left 1 Implanted   CLIP LIGAT VASC HORIZON TI MD TANISHA 6CT - [id number] Implant CLIP LIGAT VASC HORIZON TI MD TANISHA 6CT  [employer] [id number] Left 1 Implanted   CLIP LIGAT VASC HORIZON TI SM YEL 6CT - [id number] Implant CLIP LIGAT VASC HORIZON TI SM YEL 6CT  [employer] [id number] Left 1 Implanted   CLIP LIGAT VASC HORIZON TI SM YEL 6CT - [id number] Implant CLIP LIGAT VASC HORIZON TI SM YEL 6CT  [employer] [id number] Left 1 Implanted   HEMOST ABS SURGICEL ORIG 4X8IN STRL - [id number] Implant HEMOST ABS SURGICEL ORIG 4X8IN STRL  [employer]  [employer] J [employer] J [id number] Left 1 Implanted   CLIP LIGAT VASC HORIZON TI SM YEL 6CT - [id number] Implant CLIP LIGAT VASC HORIZON TI SM YEL 6CT  [employer] [id number] Left 1 Implanted   CLIP LIGAT VASC HORIZON TI SM YEL 6CT - [id number] Implant CLIP LIGAT VASC HORIZON TI SM YEL 6CT  [employer] [id number] Left 1 Implanted   CLIP LIGAT VASC HORIZON TI MD TANISHA 6CT - [id number] Implant CLIP LIGAT VASC HORIZON TI MD TANISHA 6CT  [employer] [id number] Left 1 Implanted       Specimen:          Specimens       ID Source Type Tests Collected By Collected At Frozen?    A Thyroid Tissue TISSUE PATHOLOGY EXAM   Jorje Hermosillo MD 8/29/24 0819 No    Description: LEFT THYROID LOBE FOR PERMANENT     This specimen was not marked as sent.                Findings: Left thyroid nodule growing posterior to the left recurrent laryngeal nerve removed completely grossly    Indications: The patient is a 43-year-old female with history of a left thyroid nodule and hyperthyroidism.  We discussed the risk, benefits, and alternatives to left thyroid lobectomy and the patient was agreeable.  Informed consent was obtained.    Description of Procedure:     After obtaining informed consent, the patient was taken to the operating room and placed in supine position. After appropriate DVT and antibiotic prophylaxis, general anesthesia was induced with placement of a NIM tube for nerve monitoring. The neck was prepped and draped in standard sterile fashion.    An approximately 5 cm incision was made 2 fingerbreadths superior to the suprasternal notch transversely across the midline neck.  The skin was incised using a 15 blade.  The dermis and subcutaneous tissue were divided using Bovie electrocautery.  The platysma was dissected out and divided using Bovie electrocautery.  Subplatysmal flaps were created superiorly to the thyroid cartilage, inferiorly to the sternal notch, and laterally to the sternocleidomastoid muscles.  The strap muscles were divided in the midline at the median raphae.  Attention was paid to the left neck.  The strap muscles were dissected off the anterior and lateral surfaces of the thyroid lobe using Bovie electrocautery.  Babcocks were placed on the thyroid lobe and it was retracted anteriorly and medially.  Superior thyroid vessels were ligated with combination of clips and Harmonic.  The superior and inferior parathyroid glands were identified and dissected away from the thyroid lobe with blood supply left intact.  The recurrent laryngeal nerve was identified by both nerve stimulation and visualization, with a thyroid nodule growing posterior to the nerve.  Soft tissues around this area were dissected  using bipolar electrocautery and small vessels ligated with clips.  Once the thyroid was dissected away from the area of the nerve the remainder of the thyroid lobe was dissected off the anterior surface of the trachea using bipolar cautery.  Inferior thyroid vessels were ligated with combination of clips and Harmonic. The thyroid was then transected at the level of the isthmus using the Harmonic device. The specimen was then removed from the body and inspected for parathyroid tissue, and none was noted. It was then sent to pathology as a permanent specimen.    Valsalva maneuver was performed by anesthesia to assess for any further bleeding and any further bleeding was controlled using placement of clips.  After hemostasis was obtained the left recurrent laryngeal nerve was grossly intact throughout the entire visualized course and stimulated appropriately with nerve stimulation.  Surgicel was placed in all areas of dissection.  The strap muscles were loosely reapproximated using interrupted 3-0 Vicryl.  The platysma was reapproximated using interrupted 3-0 Vicryl.  The skin was reapproximated using a running subcuticular 4-0 Monocryl.  A dry dressing was placed on top of this.  The patient awoke from anesthesia without complications and was taken to the PACU in stable condition.      Assistant: Last Dominguez PA  was responsible for performing the following activities: Retraction, Suction, Suturing, Closing, and Placing Dressing and their skilled assistance was necessary for the success of this case.    Jorje Hermosillo MD     Date: 8/29/2024  Time: 09:12 EDT

## 2024-08-29 NOTE — ANESTHESIA PROCEDURE NOTES
Airway  Urgency: elective    Date/Time: 8/29/2024 7:40 AM  Airway not difficult    General Information and Staff    Patient location during procedure: OR  Anesthesiologist: Isma Orlando MD    Indications and Patient Condition  Indications for airway management: airway protection    Preoxygenated: yes  MILS not maintained throughout  Mask difficulty assessment: 1 - vent by mask    Final Airway Details  Final airway type: endotracheal airway      Successful airway: ETT and NIM tube  Cuffed: yes   Successful intubation technique: fiberoptic  Endotracheal tube insertion site: oral  ETT size (mm): 7.0  Cormack-Lehane Classification: grade I - full view of glottis  Placement verified by: chest auscultation and capnometry   Cuff volume (mL): 7  Measured from: lips  ETT/EBT  to lips (cm): 20  Number of attempts at approach: 1  Assessment: lips, teeth, and gum same as pre-op and atraumatic intubation    Additional Comments  Negative epigastric sounds, Breath sound equal bilaterally with symmetric chest rise and fall. Very small mouth opening

## 2024-08-30 LAB
CYTO UR: NORMAL
LAB AP CASE REPORT: NORMAL
LAB AP CLINICAL INFORMATION: NORMAL
PATH REPORT.FINAL DX SPEC: NORMAL
PATH REPORT.GROSS SPEC: NORMAL

## 2024-09-12 ENCOUNTER — TRANSCRIBE ORDERS (OUTPATIENT)
Dept: LAB | Facility: HOSPITAL | Age: 44
End: 2024-09-12
Payer: COMMERCIAL

## 2024-09-12 ENCOUNTER — LAB (OUTPATIENT)
Dept: LAB | Facility: HOSPITAL | Age: 44
End: 2024-09-12
Payer: COMMERCIAL

## 2024-09-12 DIAGNOSIS — E04.1 NONTOXIC UNINODULAR GOITER: ICD-10-CM

## 2024-09-12 DIAGNOSIS — E04.1 NONTOXIC UNINODULAR GOITER: Primary | ICD-10-CM

## 2024-09-12 PROCEDURE — 36415 COLL VENOUS BLD VENIPUNCTURE: CPT

## 2024-09-12 PROCEDURE — 84443 ASSAY THYROID STIM HORMONE: CPT

## 2024-09-13 LAB — TSH SERPL DL<=0.05 MIU/L-ACNC: 0.65 UIU/ML (ref 0.27–4.2)

## 2024-09-25 LAB
NCCN CRITERIA FLAG: ABNORMAL
TYRER CUZICK SCORE: 22.3

## 2024-10-10 ENCOUNTER — HOSPITAL ENCOUNTER (OUTPATIENT)
Facility: HOSPITAL | Age: 44
Discharge: HOME | End: 2024-10-10
Admitting: RADIOLOGY
Payer: COMMERCIAL

## 2024-10-10 DIAGNOSIS — R92.8 ABNORMAL MAMMOGRAM: ICD-10-CM

## 2024-10-10 PROCEDURE — 77066 DX MAMMO INCL CAD BI: CPT

## 2024-10-25 ENCOUNTER — OFFICE VISIT (OUTPATIENT)
Dept: ENDOCRINOLOGY | Facility: CLINIC | Age: 44
End: 2024-10-25
Payer: COMMERCIAL

## 2024-10-25 VITALS
BODY MASS INDEX: 23.34 KG/M2 | DIASTOLIC BLOOD PRESSURE: 70 MMHG | SYSTOLIC BLOOD PRESSURE: 132 MMHG | WEIGHT: 154 LBS | HEART RATE: 87 BPM | HEIGHT: 68 IN

## 2024-10-25 DIAGNOSIS — E89.0 POSTOPERATIVE HYPOTHYROIDISM: Primary | ICD-10-CM

## 2024-10-25 PROBLEM — E05.90 HYPERTHYROIDISM: Status: RESOLVED | Noted: 2023-09-08 | Resolved: 2024-10-25

## 2024-10-25 PROCEDURE — 84439 ASSAY OF FREE THYROXINE: CPT | Performed by: INTERNAL MEDICINE

## 2024-10-25 PROCEDURE — 84443 ASSAY THYROID STIM HORMONE: CPT | Performed by: INTERNAL MEDICINE

## 2024-10-25 RX ORDER — NORETHINDRONE 0.35 MG/1
1 TABLET ORAL DAILY
COMMUNITY
Start: 2024-08-13

## 2024-10-25 NOTE — PROGRESS NOTES
"Thyroid Problem    Subjective   Korina Rodriguez is a 44 y.o. female. she is being seen for follow-up. She has been seen by Ludwig for hyperthyroidism management.   Hypethyroidism is treated with the methimazole. It was diagnosed in 5/2023 when TSH was < 0.05. She stopped methimazole 6 months prior to surgery, only took  medication for a few months.      Thyroid nodule dx in 5/2023. Ultrasound from 6/2023 reviewed - there is 1.8 x 2 cm left thyroid nodule with TIRADS 5. Nodule is taller than wide. FNA is performed in 09/2023 - non diagnostic specimen. 04/2024 - benign FNA by Jenni.   She underwent left hemithyroidectomy and surg pathology was benign. She has started levothyroxine 50 mcg daily after the surgery.   No sx.       Medications:    Current Outpatient Medications:     aspirin 81 MG EC tablet, Take 1 tablet by mouth Daily., Disp: , Rfl:     Nolvia 0.35 MG tablet, Take 1 tablet by mouth Daily., Disp: , Rfl:     levothyroxine (Synthroid) 50 MCG tablet, Take 1 tablet by mouth Every Morning for 90 days., Disp: 30 tablet, Rfl: 2      Review of Systems   Constitutional:  Positive for fatigue.       Objective   Vitals:    10/25/24 1345   BP: 132/70   Pulse: 87   Weight: 69.9 kg (154 lb)   Height: 172.7 cm (67.99\")    Body mass index is 23.42 kg/m².   Physical Exam  Vitals reviewed.   Constitutional:       Appearance: Normal appearance.   Neck:      Thyroid: Thyromegaly (left 2 cm nodule.) present.      Comments: Postop scar is in good condition.   Cardiovascular:      Rate and Rhythm: Normal rate and regular rhythm.      Pulses: Normal pulses.      Heart sounds: Normal heart sounds.   Pulmonary:      Effort: Pulmonary effort is normal.      Breath sounds: Normal breath sounds.   Musculoskeletal:         General: No swelling.   Neurological:      Mental Status: She is alert and oriented to person, place, and time.   Psychiatric:         Mood and Affect: Mood normal.         Thought Content: Thought content " normal.           Results for orders placed or performed in visit on 09/25/24   [facility] GENETIC RISK ASSESSMENT QUESTIONNAIRE - ,    Collection Time: 09/25/24  9:09 AM   Result Value Ref Range    Luz 22.3 (A)     NCCN NCCN not met        Assessment/Plan:    Problem List Items Addressed This Visit    None  Visit Diagnoses       Postoperative hypothyroidism    -  Primary    Relevant Orders    T4, Free    TSH          Thyroid function repeated today. I will evaluate the need for the levothyroxine replacement. Consider reducing the dose to 25 mcg after lab review.     Return in about 6 months (around 4/25/2025) for 15 min appointment.

## 2024-10-26 LAB
T4 FREE SERPL-MCNC: 1.45 NG/DL (ref 0.92–1.68)
TSH SERPL DL<=0.05 MIU/L-ACNC: 1.7 UIU/ML (ref 0.27–4.2)

## 2024-10-30 DIAGNOSIS — E03.9 HYPOTHYROIDISM, ADULT: Primary | ICD-10-CM

## 2024-10-30 RX ORDER — LEVOTHYROXINE SODIUM 25 UG/1
25 TABLET ORAL
Qty: 30 TABLET | Refills: 2 | Status: SHIPPED | OUTPATIENT
Start: 2024-10-30 | End: 2025-01-28

## 2024-11-18 ENCOUNTER — OFFICE VISIT (OUTPATIENT)
Dept: OBSTETRICS AND GYNECOLOGY | Facility: CLINIC | Age: 44
End: 2024-11-18
Payer: COMMERCIAL

## 2024-11-18 VITALS
SYSTOLIC BLOOD PRESSURE: 118 MMHG | HEIGHT: 68 IN | WEIGHT: 157.2 LBS | DIASTOLIC BLOOD PRESSURE: 78 MMHG | BODY MASS INDEX: 23.82 KG/M2

## 2024-11-18 DIAGNOSIS — Z12.4 SCREENING FOR CERVICAL CANCER: ICD-10-CM

## 2024-11-18 DIAGNOSIS — K59.09 OTHER CONSTIPATION: ICD-10-CM

## 2024-11-18 DIAGNOSIS — Z30.8 ENCOUNTER FOR OTHER CONTRACEPTIVE MANAGEMENT: ICD-10-CM

## 2024-11-18 DIAGNOSIS — Z01.419 WOMEN'S ANNUAL ROUTINE GYNECOLOGICAL EXAMINATION: Primary | ICD-10-CM

## 2024-11-18 DIAGNOSIS — N89.8 VAGINAL DISCHARGE: ICD-10-CM

## 2024-11-18 PROCEDURE — 99459 PELVIC EXAMINATION: CPT

## 2024-11-18 PROCEDURE — 99396 PREV VISIT EST AGE 40-64: CPT

## 2024-11-18 RX ORDER — NORETHINDRONE 0.35 MG/1
1 TABLET ORAL DAILY
Qty: 84 TABLET | Refills: 3 | Status: SHIPPED | OUTPATIENT
Start: 2024-11-18

## 2024-11-18 NOTE — PROGRESS NOTES
Gynecologic Annual Exam Note          GYN Annual Exam     Annual Exam        Subjective     HPI  Korina Rodriguez is a 44 y.o. female, , who presents for annual well woman exam as a established patient. There were no changes to her medical or surgical history since her last visit..  Patient's last menstrual period was 2024.  Her periods occur every 28-30 days, lasting 3-5 days.  The flow is moderate. She reports dysmenorrhea is mild occurring first 1-2 days of flow. Marital Status: . She is sexually active. She has not had new partners.. STD testing recommendations have been explained to the patient and she declines STD testing.    The patient would like to discuss the following complaints today: none    Additional OB/GYN History   contraceptive methods: Oral progesterone-only contraceptive  Desires to: continue contraception  History of migraines: no    Last Pap : 10/20/2021. Result: negative. HPV: negative.   Last Completed Pap Smear       This patient has no relevant Health Maintenance data.          History of abnormal Pap smear: no  Family history of uterine, colon, breast, or ovarian cancer:  yes - mother - breast CA  Performs monthly Self-Breast Exam: no  Last mammogram: 10/10/2024. Done at [facility]. There is a copy in the chart.    Last Completed Mammogram            Discontinued - MAMMOGRAM  Ordered on 10/11/2024        Frequency changed to Never automatically (Topic No Longer Applies)    10/10/2024  Mammo Diagnostic Bilateral With CAD    2024  Mammo Diagnostic Digital Tomosynthesis Bilateral With CAD    2024  Mammo Screening Digital Tomosynthesis Bilateral With CAD                    Colonoscopy: has never had a colonoscopy or cologuard  Exercises Regularly: no  Feelings of Anxiety or Depression: no  Tobacco Usage?: No       Current Outpatient Medications:     aspirin 81 MG EC tablet, Take 1 tablet by mouth Daily., Disp: , Rfl:     Nolvia 0.35 MG tablet, Take 1 tablet  by mouth Daily., Disp: 84 tablet, Rfl: 3    levothyroxine (Synthroid) 25 MCG tablet, Take 1 tablet by mouth Every Morning for 90 days., Disp: 30 tablet, Rfl: 2     Patient is requesting refills of Nolvia (POP).    OB History          2    Para   2    Term   2            AB        Living   2         SAB        IAB        Ectopic        Molar        Multiple        Live Births   2                Past Medical History:   Diagnosis Date    Factor V Leiden     Hyperthyroidism 23    Hyperthyroidism    Hyperthyroidism 2023    Hyperthyroidism    Screening breast examination     self admits    Solitary thyroid nodule 2023        Past Surgical History:   Procedure Laterality Date    THYROID LOBECTOMY Left 2024    Procedure: THYROID LOBECTOMY LEFT;  Surgeon: Jorje Hermosillo MD;  [facility];  Service: General;  Laterality: Left;       Health Maintenance   Topic Date Due    Annual Breast MRI  Never done    HEPATITIS C SCREENING  Never done    ANNUAL PHYSICAL  Never done    TDAP/TD VACCINES (2 - Tdap) 2023    INFLUENZA VACCINE  2024    COVID-19 Vaccine ( - - season) 2024    PAP SMEAR  10/20/2024    Annual Gynecologic Pelvic and Breast Exam  2024    MAMMOGRAM TCS >= 20  10/10/2025    Pneumococcal Vaccine 0-64  Aged Out    MAMMOGRAM  Discontinued       The additional following portions of the patient's history were reviewed and updated as appropriate: allergies, current medications, past family history, past medical history, past social history, past surgical history, and problem list.    Review of Systems   Respiratory: Negative.  Negative for shortness of breath.    Cardiovascular: Negative.  Negative for chest pain.   Gastrointestinal:  Positive for constipation. Negative for abdominal distention and abdominal pain.   Genitourinary: Negative.  Negative for breast discharge, breast lump, breast pain, dyspareunia, dysuria, menstrual problem, pelvic  "pain, pelvic pressure, urinary incontinence, vaginal bleeding, vaginal discharge and vaginal pain.   Psychiatric/Behavioral: Negative.  Negative for depressed mood. The patient is not nervous/anxious.          I have reviewed and agree with the HPI, ROS, and historical information as entered above. Acacia Harden, APRN          Objective   /78   Ht 172.7 cm (67.99\")   Wt 71.3 kg (157 lb 3.2 oz)   LMP 11/11/2024   Breastfeeding No   BMI 23.91 kg/m²     Physical Exam  Vitals and nursing note reviewed. Exam conducted with a chaperone present.   Constitutional:       General: She is not in acute distress.     Appearance: Normal appearance. She is well-developed. She is not ill-appearing or toxic-appearing.   HENT:      Head: Normocephalic and atraumatic.   Pulmonary:      Effort: Pulmonary effort is normal. No retractions.   Chest:      Chest wall: No mass.   Breasts:     Breasts are symmetrical.      Right: Normal. No mass, nipple discharge, skin change or tenderness.      Left: Normal. No mass, nipple discharge, skin change or tenderness.   Abdominal:      Palpations: Abdomen is soft. Abdomen is not rigid. There is no mass.      Tenderness: There is no abdominal tenderness. There is no guarding or rebound.      Hernia: No hernia is present. There is no hernia in the left inguinal area or right inguinal area.   Genitourinary:     General: Normal vulva.      Labia:         Right: No rash, tenderness or lesion.         Left: No rash, tenderness or lesion.       Vagina: Normal. No vaginal discharge, erythema, tenderness, bleeding or lesions.      Cervix: No cervical motion tenderness, discharge, friability, lesion, erythema or cervical bleeding.      Uterus: Normal. Not enlarged, not fixed and not tender.       Adnexa: Right adnexa normal and left adnexa normal.        Right: No mass or tenderness.          Left: No mass or tenderness.        Rectum: No external hemorrhoid.   Lymphadenopathy:      Upper Body: "      Right upper body: No supraclavicular or axillary adenopathy.      Left upper body: No supraclavicular or axillary adenopathy.   Neurological:      Mental Status: She is alert and oriented to person, place, and time.   Psychiatric:         Mood and Affect: Mood normal.         Behavior: Behavior normal.            Assessment and Plan    Problem List Items Addressed This Visit    None  Visit Diagnoses       Women's annual routine gynecological examination    -  Primary    Relevant Medications    Nolvia 0.35 MG tablet    Other Relevant Orders    LIQUID-BASED PAP SMEAR WITH HPV GENOTYPING REGARDLESS OF INTERPRETATION ([facility])    Screening for cervical cancer        Relevant Orders    LIQUID-BASED PAP SMEAR WITH HPV GENOTYPING REGARDLESS OF INTERPRETATION ([facility])    Vaginal discharge        Relevant Orders    LIQUID-BASED PAP SMEAR WITH HPV GENOTYPING REGARDLESS OF INTERPRETATION ([facility])    Encounter for other contraceptive management        Relevant Medications    Nolvia 0.35 MG tablet    Other constipation                GYN annual well woman exam.   Pap guidelines reviewed. Pap performed w/ BV and yeast. Vaginal discharge- Asymptomatic. Will treat if indicated.   Encouraged use of condoms for STD prevention.  She is happy on Jessica for contraception. She understands risks of blood clots, stroke, and theoretical risk of breast cancer. Rf sent.  Fibrocystic breast changes reviewed. Encouraged decreasing caffeine. FCBD education included in patient instructions.   Reviewed monthly self breast exams.  Instructed to call with lumps, pain, or breast discharge.    Advised to repeat dx mammogram 4/2025 as directed.   Reviewed exercise as a preventative health measures.   Increase water intake, increase dietary fiber, and begin a stool softener 1-2x/day (Colace 100mg, 1-2 times daily). Recommended All Bran cereal for constipation relief. Constipation education included in patient instructions.   Reccommended  Flu Vaccine in Fall of each year.  Return in about 1 year (around 11/18/2025) for Annual physical or sooner if needed.     Acacia Harden, APRN  11/18/2024

## 2024-11-23 LAB — REF LAB TEST METHOD: NORMAL

## 2024-12-02 DIAGNOSIS — E03.9 HYPOTHYROIDISM, ADULT: ICD-10-CM

## 2024-12-02 NOTE — TELEPHONE ENCOUNTER
Rx Refill Note  Requested Prescriptions     Pending Prescriptions Disp Refills    levothyroxine (SYNTHROID, LEVOTHROID) 25 MCG tablet [Pharmacy Med Name: LEVOTHYROXINE 25 MCG TABLET 25 Tablet] 30 tablet 2     Sig: TAKE 1 TABLET BY MOUTH EVERY MORNING -TAKE ON AN EMPTY STOMACH        Last office visit with prescribing clinician: 10/25/2024      Next office visit with prescribing clinician: 3/20/2025       Susana Cabral (Jodi)  12/02/24, 14:08 EST

## 2024-12-03 RX ORDER — LEVOTHYROXINE SODIUM 25 UG/1
TABLET ORAL
Qty: 30 TABLET | Refills: 3 | Status: SHIPPED | OUTPATIENT
Start: 2024-12-03

## 2025-01-24 ENCOUNTER — LAB (OUTPATIENT)
Dept: ENDOCRINOLOGY | Facility: CLINIC | Age: 45
End: 2025-01-24
Payer: COMMERCIAL

## 2025-01-24 DIAGNOSIS — E03.9 HYPOTHYROIDISM, ADULT: ICD-10-CM

## 2025-01-24 DIAGNOSIS — E05.90 HYPERTHYROIDISM: ICD-10-CM

## 2025-01-24 LAB
T4 FREE SERPL-MCNC: 1.15 NG/DL (ref 0.92–1.68)
TSH SERPL DL<=0.05 MIU/L-ACNC: 1.71 UIU/ML (ref 0.27–4.2)

## 2025-01-24 PROCEDURE — 84443 ASSAY THYROID STIM HORMONE: CPT | Performed by: INTERNAL MEDICINE

## 2025-01-24 PROCEDURE — 84439 ASSAY OF FREE THYROXINE: CPT | Performed by: INTERNAL MEDICINE

## 2025-01-24 PROCEDURE — 36415 COLL VENOUS BLD VENIPUNCTURE: CPT | Performed by: INTERNAL MEDICINE

## 2025-03-20 ENCOUNTER — OFFICE VISIT (OUTPATIENT)
Dept: ENDOCRINOLOGY | Facility: CLINIC | Age: 45
End: 2025-03-20
Payer: COMMERCIAL

## 2025-03-20 VITALS
DIASTOLIC BLOOD PRESSURE: 68 MMHG | BODY MASS INDEX: 24.64 KG/M2 | SYSTOLIC BLOOD PRESSURE: 108 MMHG | HEART RATE: 68 BPM | HEIGHT: 67 IN | OXYGEN SATURATION: 100 % | WEIGHT: 157 LBS

## 2025-03-20 DIAGNOSIS — E03.9 HYPOTHYROIDISM, ADULT: ICD-10-CM

## 2025-03-20 PROCEDURE — 99213 OFFICE O/P EST LOW 20 MIN: CPT | Performed by: INTERNAL MEDICINE

## 2025-03-20 RX ORDER — LEVOTHYROXINE SODIUM 25 UG/1
25 TABLET ORAL
Qty: 90 TABLET | Refills: 3 | Status: SHIPPED | OUTPATIENT
Start: 2025-03-20

## 2025-03-20 NOTE — PROGRESS NOTES
"Thyroid Problem (Postoperative hypothyroidism)    Subjective   Korina Rodriguez is a 44 y.o. female. she is being seen for follow-up. She has been seen by Ludwig for hyperthyroidism management.   Hypethyroidism is treated with the methimazole. It was diagnosed in 5/2023 when TSH was < 0.05. She stopped methimazole 6 months prior to surgery.      Thyroid nodule -s/p left hemithyroidectomy and surg pathology was benign. She has started levothyroxine 25 mcg daily after the surgery.   No sx.       Medications:    Current Outpatient Medications:     levothyroxine (SYNTHROID, LEVOTHROID) 25 MCG tablet, Take 1 tablet by mouth Every Morning., Disp: 90 tablet, Rfl: 3    aspirin 81 MG EC tablet, Take 1 tablet by mouth Daily., Disp: , Rfl:     Nolvia 0.35 MG tablet, Take 1 tablet by mouth Daily., Disp: 84 tablet, Rfl: 3      Review of Systems   Constitutional:  Positive for fatigue.       Objective   Vitals:    03/20/25 0921   BP: 108/68   BP Location: Left arm   Patient Position: Sitting   Cuff Size: Adult   Pulse: 68   SpO2: 100%   Weight: 71.2 kg (157 lb)   Height: 170.2 cm (67\")    Body mass index is 24.59 kg/m².   Physical Exam  Vitals reviewed.   Constitutional:       Appearance: Normal appearance.   Neck:      Thyroid: Thyromegaly (left 2 cm nodule.) present.      Comments: Postop scar is in good condition.   Cardiovascular:      Rate and Rhythm: Normal rate and regular rhythm.      Pulses: Normal pulses.      Heart sounds: Normal heart sounds.   Pulmonary:      Effort: Pulmonary effort is normal.      Breath sounds: Normal breath sounds.   Musculoskeletal:         General: No swelling.   Neurological:      Mental Status: She is alert and oriented to person, place, and time.   Psychiatric:         Mood and Affect: Mood normal.         Thought Content: Thought content normal.           Results for orders placed or performed in visit on 01/24/25   TSH    Collection Time: 01/24/25  1:11 PM    Specimen: Arm, Left; Blood "   Result Value Ref Range    TSH 1.710 0.270 - 4.200 uIU/mL   T4, Free    Collection Time: 01/24/25  1:11 PM    Specimen: Arm, Left; Blood   Result Value Ref Range    Free T4 1.15 0.92 - 1.68 ng/dL     Assessment/Plan:    Problem List Items Addressed This Visit    None  Visit Diagnoses         Hypothyroidism, adult        Relevant Medications    levothyroxine (SYNTHROID, LEVOTHROID) 25 MCG tablet    Other Relevant Orders    T4, Free    TSH            Thyroid function repeated today. I will evaluate the need for the levothyroxine replacement. Consider reducing the dose to 25 mcg after lab review.   I will check the bedside u/s in 1-2 years to assess remaining lobe.     Return in about 1 year (around 3/20/2026) for 15 min appointment.

## (undated) DEVICE — ELECTRD BLD EZ CLN MOD XLNG 2.75IN

## (undated) DEVICE — ELECTRODE 8227410 PAIRED 2 CH SET ROHS

## (undated) DEVICE — DRAPE,INSTRUMENT,MAGNETIC,10X16: Brand: MEDLINE

## (undated) DEVICE — SUT SILK 3/0 TIES 18IN A184H

## (undated) DEVICE — BLANKT WARM LOWR/BDY 100X120CM

## (undated) DEVICE — TOWEL,OR,DSP,ST,BLUE,STD,4/PK,20PK/CS: Brand: MEDLINE

## (undated) DEVICE — GAUZE,SPONGE,4"X4",16PLY,XRAY,STRL,LF: Brand: MEDLINE

## (undated) DEVICE — DISPOSABLE BIPOLAR FORCEPS 4" (10.2CM) JEWELERS, STRAIGHT 0.4MM TIP AND 12 FT. (3.6M) CABLE: Brand: KIRWAN

## (undated) DEVICE — ANTIBACTERIAL UNDYED BRAIDED (POLYGLACTIN 910), SYNTHETIC ABSORBABLE SUTURE: Brand: COATED VICRYL

## (undated) DEVICE — TRAP FLD MINIVAC MEGADYNE 100ML

## (undated) DEVICE — PATIENT RETURN ELECTRODE, SINGLE-USE, CONTACT QUALITY MONITORING, ADULT, WITH 9FT CORD, FOR PATIENTS WEIGING OVER 33LBS. (15KG): Brand: MEGADYNE

## (undated) DEVICE — SUT SILK 2/0 TIES 18IN A185H

## (undated) DEVICE — PK MINOR SPLT 10

## (undated) DEVICE — PCH INST SURG INVISISHIELD 2PCKT

## (undated) DEVICE — HARMONIC FOCUS SHEARS 9CM LENGTH + ADAPTIVE TISSUE TECHNOLOGY FOR USE WITH BLUE HAND PIECE ONLY: Brand: HARMONIC FOCUS

## (undated) DEVICE — DRSNG SURESITE WNDW 4X4.5

## (undated) DEVICE — SPNG LAP CIGARETTE KITTNER 5MM STRL PK/5

## (undated) DEVICE — INTENDED USE FOR SURGICAL MARKING ON INTACT SKIN, ALSO PROVIDES A PERMANENT METHOD OF IDENTIFYING OBJECTS IN THE OPERATING ROOM: Brand: WRITESITE® REGULAR TIP SKIN MARKER

## (undated) DEVICE — PROBE 8225825X 3PK INCREMT STD PRASS TIP: Brand: NIM

## (undated) DEVICE — APPL CHLORAPREP 26ML CLR

## (undated) DEVICE — GLV SURG BIOGEL LTX PF 7

## (undated) DEVICE — HDRST PAD EA/20PC

## (undated) DEVICE — UNDERGLV SURG BIOGEL INDICATOR LF PF 7.5

## (undated) DEVICE — SUT MNCRYL PLS ANTIB UD 4/0 PS2 18IN